# Patient Record
Sex: MALE | Race: WHITE | Employment: OTHER | URBAN - METROPOLITAN AREA
[De-identification: names, ages, dates, MRNs, and addresses within clinical notes are randomized per-mention and may not be internally consistent; named-entity substitution may affect disease eponyms.]

---

## 2019-10-19 ENCOUNTER — HOSPITAL ENCOUNTER (EMERGENCY)
Age: 84
Discharge: HOME | End: 2019-10-19
Attending: EMERGENCY MEDICINE
Payer: MEDICARE

## 2019-10-19 ENCOUNTER — APPOINTMENT (OUTPATIENT)
Dept: GENERAL RADIOLOGY | Age: 84
End: 2019-10-19
Attending: EMERGENCY MEDICINE
Payer: MEDICARE

## 2019-10-19 VITALS
WEIGHT: 145.28 LBS | DIASTOLIC BLOOD PRESSURE: 69 MMHG | HEART RATE: 89 BPM | HEIGHT: 67 IN | SYSTOLIC BLOOD PRESSURE: 158 MMHG | OXYGEN SATURATION: 93 % | RESPIRATION RATE: 16 BRPM | TEMPERATURE: 97.9 F | BODY MASS INDEX: 22.8 KG/M2

## 2019-10-19 DIAGNOSIS — S51.012A SKIN TEAR OF LEFT ELBOW WITHOUT COMPLICATION, INITIAL ENCOUNTER: ICD-10-CM
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
## 2019-10-19 PROCEDURE — 74011250636 HC RX REV CODE- 250/636: Performed by: EMERGENCY MEDICINE

## 2019-10-19 PROCEDURE — 90471 IMMUNIZATION ADMIN: CPT

## 2019-10-19 PROCEDURE — 90715 TDAP VACCINE 7 YRS/> IM: CPT | Performed by: EMERGENCY MEDICINE

## 2019-10-19 PROCEDURE — 77030018836 HC SOL IRR NACL ICUM -A

## 2019-10-19 PROCEDURE — 73080 X-RAY EXAM OF ELBOW: CPT

## 2019-10-19 PROCEDURE — 99282 EMERGENCY DEPT VISIT SF MDM: CPT

## 2019-10-19 RX ADMIN — TETANUS TOXOID, REDUCED DIPHTHERIA TOXOID AND ACELLULAR PERTUSSIS VACCINE, ADSORBED 0.5 ML: 5; 2.5; 8; 8; 2.5 SUSPENSION INTRAMUSCULAR at 15:34

## 2019-10-19 NOTE — ED TRIAGE NOTES
Pt rpts falling in the shower last night injuring his left elbow. Bandage in place. Pt also rpts he did hit his head but denies LOC.

## 2019-10-19 NOTE — ED PROVIDER NOTES
72-year-old male with no significant past medical history presents after a ground-level fall yesterday while getting into the shower. He struck his left elbow. He hit his head but did not lose consciousness. He is acting normally per his daughter. He is not vomiting. He is not confused. They brought him in mostly because of the skin tear to his left elbow. He is not sure when his last tetanus was. There are no other medical complaints at this time. He denies any other injuries. Skin Problem      Wound Check           Past Medical History:   Diagnosis Date    Cancer [facility])        Past Surgical History:   Procedure Laterality Date    HX UROLOGICAL           History reviewed. No pertinent family history.     Social History     Socioeconomic History    Marital status:      Spouse name: Not on file    Number of children: Not on file    Years of education: Not on file    Highest education level: Not on file   Occupational History    Not on file   Social Needs    Financial resource strain: Not on file    Food insecurity:     Worry: Not on file     Inability: Not on file    Transportation needs:     Medical: Not on file     Non-medical: Not on file   Tobacco Use    Smoking status: Former Smoker    Smokeless tobacco: Never Used   Substance and Sexual Activity    Alcohol use: Yes     Comment: a little    Drug use: Not on file    Sexual activity: Not on file   Lifestyle    Physical activity:     Days per week: Not on file     Minutes per session: Not on file    Stress: Not on file   Relationships    Social connections:     Talks on phone: Not on file     Gets together: Not on file     Attends Latter-day service: Not on file     Active member of club or organization: Not on file     Attends meetings of clubs or organizations: Not on file     Relationship status: Not on file    Intimate partner violence:     Fear of current or ex partner: Not on file     Emotionally abused: Not on file Physically abused: Not on file     Forced sexual activity: Not on file   Other Topics Concern    Not on file   Social History Narrative    Not on file         ALLERGIES: Sulfa (sulfonamide antibiotics)    Review of Systems   All other systems reviewed and are negative. Vitals:    10/19/19 1449   BP: 158/69   Pulse: 89   Resp: 16   Temp: 97.9 °F (36.6 °C)   SpO2: 93%   Weight: 65.9 kg (145 lb 4.5 oz)   Height: 5' 7\" (1.702 m)            Physical Exam   Constitutional: He is oriented to person, place, and time. He appears well-developed and well-nourished. HENT:   Head: Normocephalic and atraumatic. Eyes: No scleral icterus. Neck: No tracheal deviation present. Cardiovascular: Normal rate. Pulmonary/Chest: Effort normal.   Abdominal: He exhibits no distension. Musculoskeletal: He exhibits tenderness (Left elbow). He exhibits no deformity. Neurological: He is alert and oriented to person, place, and time. No cranial nerve deficit. Skin: Skin is warm and dry. Skin tear left elbow   Psychiatric: He has a normal mood and affect. Nursing note and vitals reviewed. MDM  Number of Diagnoses or Management Options  Fall, initial encounter:   Skin tear of left elbow without complication, initial encounter:   Diagnosis management comments: 80-year-old male presents after fall yesterday. He did hit his head but is acting normally and on no blood thinners. No signs of head trauma.  -Elbow x-ray normal.  -Tetanus updated  -Advised to follow-up with primary care doctor for wound recheck  -Return to the emergency department for new worsening symptoms         Procedures        Cornelius Villavicencio MD

## 2019-10-19 NOTE — ED NOTES
Belkis scrub to left elbow with NS irrigation. Steri strips to skin tear. Telfa to wound, 4 x 4 with fluffy roll gauze.

## 2019-10-19 NOTE — DISCHARGE INSTRUCTIONS
Patient Education        Preventing Falls: Care Instructions  Your Care Instructions    Getting around your home safely can be a challenge if you have injuries or health problems that make it easy for you to fall. Loose rugs and furniture in walkways are among the dangers for many older people who have problems walking or who have poor eyesight. People who have conditions such as arthritis, osteoporosis, or dementia also have to be careful not to fall. You can make your home safer with a few simple measures. Follow-up care is a key part of your treatment and safety. Be sure to make and go to all appointments, and call your doctor if you are having problems. It's also a good idea to know your test results and keep a list of the medicines you take. How can you care for yourself at home? Taking care of yourself  · You may get dizzy if you do not drink enough water. To prevent dehydration, drink plenty of fluids, enough so that your urine is light yellow or clear like water. Choose water and other caffeine-free clear liquids. If you have kidney, heart, or liver disease and have to limit fluids, talk with your doctor before you increase the amount of fluids you drink. · Exercise regularly to improve your strength, muscle tone, and balance. Walk if you can. Swimming may be a good choice if you cannot walk easily. · Have your vision and hearing checked each year or any time you notice a change. If you have trouble seeing and hearing, you might not be able to avoid objects and could lose your balance. · Know the side effects of the medicines you take. Ask your doctor or pharmacist whether the medicines you take can affect your balance. Sleeping pills or sedatives can affect your balance. · Limit the amount of alcohol you drink. Alcohol can impair your balance and other senses. · Ask your doctor whether calluses or corns on your feet need to be removed.  If you wear loose-fitting shoes because of calluses or corns, you can lose your balance and fall. · Talk to your doctor if you have numbness in your feet. Preventing falls at home  · Remove raised doorway thresholds, throw rugs, and clutter. Repair loose carpet or raised areas in the floor. · Move furniture and electrical cords to keep them out of walking paths. · Use nonskid floor wax, and wipe up spills right away, especially on ceramic tile floors. · If you use a walker or cane, put rubber tips on it. If you use crutches, clean the bottoms of them regularly with an abrasive pad, such as steel wool. · Keep your house well lit, especially [place], and outside walkways. Use night-lights in areas such as hallways and bathrooms. Add extra light switches or use remote switches (such as switches that go on or off when you clap your hands) to make it easier to turn lights on if you have to get up during the night. · Install sturdy handrails on stairways. · Move items in your cabinets so that the things you use a lot are on the lower shelves (about waist level). · Keep a cordless phone and a flashlight with new batteries by your bed. If possible, put a phone in each of the main rooms of your house, or carry a cell phone in case you fall and cannot reach a phone. Or, you can wear a device around your neck or wrist. You push a button that sends a signal for help. · Wear low-heeled shoes that fit well and give your feet good support. Use footwear with nonskid soles. Check the heels and soles of your shoes for wear. Repair or replace worn heels or soles. · Do not wear socks without shoes on wood floors. · Walk on the grass when the sidewalks are slippery. If you live in an area that gets snow and ice in the winter, sprinkle salt on slippery steps and sidewalks. Preventing falls in the bath  · Install grab bars and nonskid mats inside and outside your shower or tub and near the toilet and sinks. · Use shower chairs and bath benches.   · Use a hand-held shower head that will allow you to sit while showering. · Get into a tub or shower by putting the weaker leg in first. Get out of a tub or shower with your strong side first.  · Repair loose toilet seats and consider installing a raised toilet seat to make getting on and off the toilet easier. · Keep your bathroom door unlocked while you are in the shower. Where can you learn more? Go to http://rosanne-davonte.info/. Enter 0476 [id number] 69 [id number] in the search box to learn more about \"Preventing Falls: Care Instructions. \"  Current as of: March 16, 2018  Content Version: 11.8  © 5218-7858 [employer]. Care instructions adapted under license by [employer] (which disclaims liability or warranty for this information). If you have questions about a medical condition or this instruction, always ask your healthcare professional. [place] [address] any warranty or liability for your use of this information. Patient Education     Skin Tears: Care Instructions  Your Care Instructions  As we get older, our skin gets drier and more fragile. Sometimes this can cause the outer layers of skin to split and tear open. Skin tears are treated in different ways. In some cases, doctors use pieces of tape called Steri-Strips to pull the skin together and help it heal. Other times, it's best to leave the tear open and cover it with a special wound-care bandage. Skin tears are usually not serious. They usually heal in a few weeks. But how long you take to heal depends on your body and the type of tear you have. Sometimes the torn piece of skin is used to protect the wound while it heals. But that piece of skin does not heal. It may fall off on its own. Or the doctor may remove it. As your tear heals, it's important to keep it clean to help prevent infection. The doctor has checked you carefully, but problems can develop later.  If you notice any problems or new symptoms, get medical treatment right away.  Follow-up care is a key part of your treatment and safety. Be sure to make and go to all appointments, and call your doctor if you are having problems. It's also a good idea to know your test results and keep a list of the medicines you take. How can you care for yourself at home? · If you have pain, ask your doctor if you can take an over-the-counter pain medicine, such as acetaminophen (Tylenol), ibuprofen (Advil, Motrin), or naproxen (Aleve). Be safe with medicines. Read and follow all instructions on the label. · If you have a bandage, follow your doctor's instructions for changing it. · If you have Steri-Strips, leave them on until they fall off. · Follow your doctor's instructions about bathing. · Gently wash the skin tear with plain water 2 times a day. Do not rub the area. · Let the area air dry. Or you can pat it carefully with a soft towel. When should you call for help? Call your doctor now or seek immediate medical care if:  · You have signs of infection, such as:  ¨ Increased pain, swelling, warmth, or redness around the tear. ¨ Red streaks leading from the tear. ¨ Pus draining from the tear. ¨ A fever. · The tear starts to bleed a lot. Small amounts of blood are normal.  Watch closely for changes in your health, and be sure to contact your doctor if:  · You do not get better as expected. Where can you learn more? Go to [employer].be  Enter K393 in the search box to learn more about \"Skin Tears: Care Instructions. \"   © 3806-8147 [employer]. Care instructions adapted under license by [facility] (which disclaims liability or warranty for this information). This care instruction is for use with your licensed healthcare professional. If you have questions about a medical condition or this instruction, always ask your healthcare professional. [place] [address] any warranty or liability for your use of this information.   Content Version: 52.1.305782; Current as of: May 22, 2015

## 2024-07-12 ENCOUNTER — HOSPITAL ENCOUNTER (INPATIENT)
Facility: HOSPITAL | Age: 89
LOS: 6 days | Discharge: HOME | End: 2024-07-18
Attending: EMERGENCY MEDICINE | Admitting: FAMILY MEDICINE
Payer: MEDICARE

## 2024-07-12 ENCOUNTER — APPOINTMENT (OUTPATIENT)
Facility: HOSPITAL | Age: 89
End: 2024-07-12
Payer: MEDICARE

## 2024-07-12 DIAGNOSIS — R06.02 SHORTNESS OF BREATH: ICD-10-CM

## 2024-07-12 DIAGNOSIS — I50.9 ACUTE CONGESTIVE HEART FAILURE, UNSPECIFIED HEART FAILURE TYPE (HCC): Primary | ICD-10-CM

## 2024-07-12 DIAGNOSIS — R09.02 HYPOXEMIA: ICD-10-CM

## 2024-07-12 DIAGNOSIS — J81.0 ACUTE PULMONARY EDEMA (HCC): ICD-10-CM

## 2024-07-12 LAB
ALBUMIN SERPL-MCNC: 3.3 G/DL (ref 3.5–5)
ALBUMIN/GLOB SERPL: 1 (ref 1.1–2.2)
ALP SERPL-CCNC: 74 U/L (ref 45–117)
ALT SERPL-CCNC: 24 U/L (ref 12–78)
ANION GAP SERPL CALC-SCNC: 9 MMOL/L (ref 5–15)
AST SERPL-CCNC: 18 U/L (ref 15–37)
BASOPHILS # BLD: 0 K/UL (ref 0–0.1)
BASOPHILS NFR BLD: 0 % (ref 0–1)
BILIRUB SERPL-MCNC: 0.6 MG/DL (ref 0.2–1)
BUN SERPL-MCNC: 25 MG/DL (ref 6–20)
BUN/CREAT SERPL: 22 (ref 12–20)
CALCIUM SERPL-MCNC: 9.1 MG/DL (ref 8.5–10.1)
CHLORIDE SERPL-SCNC: 103 MMOL/L (ref 97–108)
CO2 SERPL-SCNC: 29 MMOL/L (ref 21–32)
COMMENT:: NORMAL
CREAT SERPL-MCNC: 1.15 MG/DL (ref 0.7–1.3)
DIFFERENTIAL METHOD BLD: ABNORMAL
EKG ATRIAL RATE: 81 BPM
EKG DIAGNOSIS: NORMAL
EKG P AXIS: 108 DEGREES
EKG P-R INTERVAL: 194 MS
EKG Q-T INTERVAL: 390 MS
EKG QRS DURATION: 110 MS
EKG QTC CALCULATION (BAZETT): 453 MS
EKG R AXIS: -59 DEGREES
EKG T AXIS: 86 DEGREES
EKG VENTRICULAR RATE: 81 BPM
EOSINOPHIL # BLD: 0.1 K/UL (ref 0–0.4)
EOSINOPHIL NFR BLD: 1 % (ref 0–7)
ERYTHROCYTE [DISTWIDTH] IN BLOOD BY AUTOMATED COUNT: 15.9 % (ref 11.5–14.5)
GLOBULIN SER CALC-MCNC: 3.3 G/DL (ref 2–4)
GLUCOSE SERPL-MCNC: 90 MG/DL (ref 65–100)
HCT VFR BLD AUTO: 34.4 % (ref 36.6–50.3)
HGB BLD-MCNC: 11.7 G/DL (ref 12.1–17)
IMM GRANULOCYTES # BLD AUTO: 0 K/UL (ref 0–0.04)
IMM GRANULOCYTES NFR BLD AUTO: 0 % (ref 0–0.5)
LYMPHOCYTES # BLD: 1 K/UL (ref 0.8–3.5)
LYMPHOCYTES NFR BLD: 14 % (ref 12–49)
MCH RBC QN AUTO: 31.5 PG (ref 26–34)
MCHC RBC AUTO-ENTMCNC: 34 G/DL (ref 30–36.5)
MCV RBC AUTO: 92.7 FL (ref 80–99)
MONOCYTES # BLD: 1.1 K/UL (ref 0–1)
MONOCYTES NFR BLD: 15 % (ref 5–13)
NEUTS SEG # BLD: 5.3 K/UL (ref 1.8–8)
NEUTS SEG NFR BLD: 70 % (ref 32–75)
NRBC # BLD: 0 K/UL (ref 0–0.01)
NRBC BLD-RTO: 0 PER 100 WBC
NT PRO BNP: 2432 PG/ML (ref 0–450)
PLATELET # BLD AUTO: 174 K/UL (ref 150–400)
PMV BLD AUTO: 12.2 FL (ref 8.9–12.9)
POTASSIUM SERPL-SCNC: 4.3 MMOL/L (ref 3.5–5.1)
PROT SERPL-MCNC: 6.6 G/DL (ref 6.4–8.2)
RBC # BLD AUTO: 3.71 M/UL (ref 4.1–5.7)
SODIUM SERPL-SCNC: 141 MMOL/L (ref 136–145)
SPECIMEN HOLD: NORMAL
TROPONIN I SERPL HS-MCNC: 54 NG/L (ref 0–76)
WBC # BLD AUTO: 7.5 K/UL (ref 4.1–11.1)

## 2024-07-12 PROCEDURE — 84484 ASSAY OF TROPONIN QUANT: CPT

## 2024-07-12 PROCEDURE — 93010 ELECTROCARDIOGRAM REPORT: CPT | Performed by: SPECIALIST

## 2024-07-12 PROCEDURE — 6360000002 HC RX W HCPCS: Performed by: EMERGENCY MEDICINE

## 2024-07-12 PROCEDURE — 2580000003 HC RX 258: Performed by: FAMILY MEDICINE

## 2024-07-12 PROCEDURE — 99285 EMERGENCY DEPT VISIT HI MDM: CPT

## 2024-07-12 PROCEDURE — 85025 COMPLETE CBC W/AUTO DIFF WBC: CPT

## 2024-07-12 PROCEDURE — 96374 THER/PROPH/DIAG INJ IV PUSH: CPT

## 2024-07-12 PROCEDURE — 93005 ELECTROCARDIOGRAM TRACING: CPT | Performed by: EMERGENCY MEDICINE

## 2024-07-12 PROCEDURE — 71045 X-RAY EXAM CHEST 1 VIEW: CPT

## 2024-07-12 PROCEDURE — 80053 COMPREHEN METABOLIC PANEL: CPT

## 2024-07-12 PROCEDURE — 2060000000 HC ICU INTERMEDIATE R&B

## 2024-07-12 PROCEDURE — 83880 ASSAY OF NATRIURETIC PEPTIDE: CPT

## 2024-07-12 RX ORDER — ONDANSETRON 4 MG/1
4 TABLET, ORALLY DISINTEGRATING ORAL EVERY 8 HOURS PRN
Status: DISCONTINUED | OUTPATIENT
Start: 2024-07-12 | End: 2024-07-16

## 2024-07-12 RX ORDER — ONDANSETRON 2 MG/ML
4 INJECTION INTRAMUSCULAR; INTRAVENOUS EVERY 6 HOURS PRN
Status: DISCONTINUED | OUTPATIENT
Start: 2024-07-12 | End: 2024-07-16

## 2024-07-12 RX ORDER — SODIUM CHLORIDE 0.9 % (FLUSH) 0.9 %
5-40 SYRINGE (ML) INJECTION PRN
Status: DISCONTINUED | OUTPATIENT
Start: 2024-07-12 | End: 2024-07-18 | Stop reason: HOSPADM

## 2024-07-12 RX ORDER — TAMSULOSIN HYDROCHLORIDE 0.4 MG/1
0.4 CAPSULE ORAL DAILY
Status: DISCONTINUED | OUTPATIENT
Start: 2024-07-13 | End: 2024-07-18 | Stop reason: HOSPADM

## 2024-07-12 RX ORDER — AMLODIPINE BESYLATE 5 MG/1
5 TABLET ORAL DAILY
Status: ON HOLD | COMMUNITY
End: 2024-07-17 | Stop reason: HOSPADM

## 2024-07-12 RX ORDER — TAMSULOSIN HYDROCHLORIDE 0.4 MG/1
0.4 CAPSULE ORAL DAILY
COMMUNITY

## 2024-07-12 RX ORDER — AMLODIPINE BESYLATE 5 MG/1
5 TABLET ORAL DAILY
Status: DISCONTINUED | OUTPATIENT
Start: 2024-07-13 | End: 2024-07-16

## 2024-07-12 RX ORDER — FUROSEMIDE 10 MG/ML
20 INJECTION INTRAMUSCULAR; INTRAVENOUS 2 TIMES DAILY
Status: DISCONTINUED | OUTPATIENT
Start: 2024-07-13 | End: 2024-07-14

## 2024-07-12 RX ORDER — ACETAMINOPHEN 325 MG/1
650 TABLET ORAL EVERY 6 HOURS PRN
Status: DISCONTINUED | OUTPATIENT
Start: 2024-07-12 | End: 2024-07-18 | Stop reason: HOSPADM

## 2024-07-12 RX ORDER — POTASSIUM CHLORIDE 7.45 MG/ML
10 INJECTION INTRAVENOUS PRN
Status: DISCONTINUED | OUTPATIENT
Start: 2024-07-12 | End: 2024-07-16

## 2024-07-12 RX ORDER — SODIUM CHLORIDE 9 MG/ML
INJECTION, SOLUTION INTRAVENOUS PRN
Status: DISCONTINUED | OUTPATIENT
Start: 2024-07-12 | End: 2024-07-18 | Stop reason: HOSPADM

## 2024-07-12 RX ORDER — ENOXAPARIN SODIUM 100 MG/ML
40 INJECTION SUBCUTANEOUS DAILY
Status: DISCONTINUED | OUTPATIENT
Start: 2024-07-13 | End: 2024-07-14

## 2024-07-12 RX ORDER — POTASSIUM CHLORIDE 750 MG/1
40 TABLET, FILM COATED, EXTENDED RELEASE ORAL PRN
Status: DISCONTINUED | OUTPATIENT
Start: 2024-07-12 | End: 2024-07-16

## 2024-07-12 RX ORDER — FUROSEMIDE 10 MG/ML
20 INJECTION INTRAMUSCULAR; INTRAVENOUS ONCE
Status: COMPLETED | OUTPATIENT
Start: 2024-07-12 | End: 2024-07-12

## 2024-07-12 RX ORDER — ACETAMINOPHEN 650 MG/1
650 SUPPOSITORY RECTAL EVERY 6 HOURS PRN
Status: DISCONTINUED | OUTPATIENT
Start: 2024-07-12 | End: 2024-07-18 | Stop reason: HOSPADM

## 2024-07-12 RX ORDER — POLYETHYLENE GLYCOL 3350 17 G/17G
17 POWDER, FOR SOLUTION ORAL DAILY PRN
Status: DISCONTINUED | OUTPATIENT
Start: 2024-07-12 | End: 2024-07-18 | Stop reason: HOSPADM

## 2024-07-12 RX ORDER — SODIUM CHLORIDE 0.9 % (FLUSH) 0.9 %
5-40 SYRINGE (ML) INJECTION EVERY 12 HOURS SCHEDULED
Status: DISCONTINUED | OUTPATIENT
Start: 2024-07-12 | End: 2024-07-18 | Stop reason: HOSPADM

## 2024-07-12 RX ORDER — MAGNESIUM SULFATE IN WATER 40 MG/ML
2000 INJECTION, SOLUTION INTRAVENOUS PRN
Status: DISCONTINUED | OUTPATIENT
Start: 2024-07-12 | End: 2024-07-16

## 2024-07-12 RX ADMIN — FUROSEMIDE 20 MG: 10 INJECTION, SOLUTION INTRAMUSCULAR; INTRAVENOUS at 17:34

## 2024-07-12 RX ADMIN — SODIUM CHLORIDE, PRESERVATIVE FREE 10 ML: 5 INJECTION INTRAVENOUS at 20:00

## 2024-07-12 ASSESSMENT — LIFESTYLE VARIABLES
HOW OFTEN DO YOU HAVE A DRINK CONTAINING ALCOHOL: NEVER
HOW MANY STANDARD DRINKS CONTAINING ALCOHOL DO YOU HAVE ON A TYPICAL DAY: PATIENT DOES NOT DRINK

## 2024-07-12 NOTE — ED NOTES
TRANSFER - OUT REPORT:    Verbal report given to [facility] on Tyrell Garrido  being transferred to [facility] for routine progression of patient care       Report consisted of patient's Situation, Background, Assessment and   Recommendations(SBAR).     Information from the following report(s) Nurse Handoff Report, ED Encounter Summary, and ED SBAR was reviewed with the receiving nurse.    [place] Fall Assessment:    Presents to emergency department  because of falls (Syncope, seizure, or loss of consciousness): No  Age > 70: Yes  Altered Mental Status, Intoxication with alcohol or substance confusion (Disorientation, impaired judgment, poor safety awaremess, or inability to follow instructions): No  Impaired Mobility: Ambulates or transfers with assistive devices or assistance; Unable to ambulate or transer.: Yes  Nursing Judgement: Yes          Lines:   Peripheral IV 07/12/24 Right Antecubital (Active)   Site Assessment Clean, dry & intact 07/12/24 1519   Line Status Blood return noted;Brisk blood return 07/12/24 1519   Phlebitis Assessment No symptoms 07/12/24 1519   Infiltration Assessment 0 07/12/24 1519   Alcohol Cap Used No 07/12/24 1519   Dressing Status New dressing applied 07/12/24 1519   Dressing Type Transparent 07/12/24 1519        Opportunity for questions and clarification was provided.      Patient transported with:  Monitor and O2 @ 1lpm NC

## 2024-07-12 NOTE — ED PROVIDER NOTES
[facility] EMERGENCY DEPT  EMERGENCY DEPARTMENT ENCOUNTER      Pt Name: Tyrell Garrido  MRN: [id number]  Birthdate 8/21/1920  Date of evaluation: 7/12/2024  Provider: Harrison Silva MD      HISTORY OF PRESENT ILLNESS      89-year-old male with history of hypertension presenting to the ER for shortness of breath.  According to family he has had some exertional shortness of breath for the last 4 to 6 months.  The has been reluctant to come to the hospital to get evaluated.  He states that he gets short of breath if he is getting up in the [place] just to use the bathroom or walk short distances.  No previous history of congestive heart failure.  He is 88% on room air here.  He has started on 2 L nasal cannula.  He does not use any oxygen at home.              Nursing Notes were reviewed.    REVIEW OF SYSTEMS         Review of Systems   Constitutional:  Negative for fatigue and fever.   HENT:  Negative for rhinorrhea and sore throat.    Respiratory:  Positive for shortness of breath. Negative for cough.    Cardiovascular:  Positive for leg swelling. Negative for chest pain.   Gastrointestinal:  Negative for abdominal pain and vomiting.   Musculoskeletal:  Negative for back pain and neck pain.   Skin:  Negative for rash.   Neurological:  Negative for dizziness, weakness and headaches.           PAST MEDICAL HISTORY     Past Medical History:   Diagnosis Date    Cancer (HCC)          SURGICAL HISTORY       Past Surgical History:   Procedure Laterality Date    UROLOGICAL SURGERY           CURRENT MEDICATIONS       Previous Medications    AMLODIPINE (NORVASC) 5 MG TABLET    Take 1 tablet by mouth daily    TAMSULOSIN (FLOMAX) 0.4 MG CAPSULE    Take 1 capsule by mouth daily       ALLERGIES     Patient has no known allergies.    FAMILY HISTORY     History reviewed. No pertinent family history.       SOCIAL HISTORY       Social History     Socioeconomic History    Marital status:      Spouse name: None    Number of

## 2024-07-12 NOTE — ED TRIAGE NOTES
Pt was wheeled to the treatment area accompanied by his daughter. Daughter states \"for 4-6 months he has had difficulty walking during the day without sitting down, then last night he had trouble lying flat and was more short of breath he has swollen ankles for 3 days and left lower ankle has a wound he said he drained himself.\" Pt admits to non compliance with medications Amlodipine and Flomax \"I only take my meds every 5 days.\"

## 2024-07-13 ENCOUNTER — APPOINTMENT (OUTPATIENT)
Facility: HOSPITAL | Age: 89
End: 2024-07-13
Attending: FAMILY MEDICINE
Payer: MEDICARE

## 2024-07-13 PROBLEM — R06.02 SHORTNESS OF BREATH: Status: ACTIVE | Noted: 2024-07-13

## 2024-07-13 LAB
ANION GAP SERPL CALC-SCNC: 8 MMOL/L (ref 5–15)
BUN SERPL-MCNC: 21 MG/DL (ref 6–20)
BUN/CREAT SERPL: 18 (ref 12–20)
CALCIUM SERPL-MCNC: 8.6 MG/DL (ref 8.5–10.1)
CHLORIDE SERPL-SCNC: 104 MMOL/L (ref 97–108)
CHOLEST SERPL-MCNC: 118 MG/DL
CO2 SERPL-SCNC: 27 MMOL/L (ref 21–32)
CREAT SERPL-MCNC: 1.16 MG/DL (ref 0.7–1.3)
ECHO AO ARCH DIAM: 2 CM
ECHO AO ASC DIAM: 3.9 CM
ECHO AO ASCENDING AORTA INDEX: 2.18 CM/M2
ECHO AO ROOT DIAM: 4.2 CM
ECHO AO ROOT INDEX: 2.35 CM/M2
ECHO AR MAX VEL PISA: 3.6 M/S
ECHO AV AREA PEAK VELOCITY: 1.7 CM2
ECHO AV AREA/BSA PEAK VELOCITY: 0.9 CM2/M2
ECHO AV PEAK GRADIENT: 14 MMHG
ECHO AV PEAK VELOCITY: 1.9 M/S
ECHO AV REGURGITANT PHT: 277.8 MILLISECOND
ECHO AV VELOCITY RATIO: 0.42
ECHO BSA: 1.79 M2
ECHO LA DIAMETER INDEX: 1.73 CM/M2
ECHO LA DIAMETER: 3.1 CM
ECHO LA TO AORTIC ROOT RATIO: 0.74
ECHO LA VOL A-L A2C: 44 ML (ref 18–58)
ECHO LA VOL A-L A4C: 63 ML (ref 18–58)
ECHO LA VOL BP: 50 ML (ref 18–58)
ECHO LA VOL MOD A2C: 43 ML (ref 18–58)
ECHO LA VOL MOD A4C: 57 ML (ref 18–58)
ECHO LA VOL/BSA BIPLANE: 28 ML/M2 (ref 16–34)
ECHO LA VOLUME AREA LENGTH: 53 ML
ECHO LA VOLUME INDEX A-L A2C: 25 ML/M2 (ref 16–34)
ECHO LA VOLUME INDEX A-L A4C: 35 ML/M2 (ref 16–34)
ECHO LA VOLUME INDEX AREA LENGTH: 30 ML/M2 (ref 16–34)
ECHO LA VOLUME INDEX MOD A2C: 24 ML/M2 (ref 16–34)
ECHO LA VOLUME INDEX MOD A4C: 32 ML/M2 (ref 16–34)
ECHO LV E' LATERAL VELOCITY: 7 CM/S
ECHO LV E' SEPTAL VELOCITY: 6 CM/S
ECHO LV EDV A2C: 46 ML
ECHO LV EDV A4C: 79 ML
ECHO LV EDV BP: 61 ML (ref 67–155)
ECHO LV EDV INDEX A4C: 44 ML/M2
ECHO LV EDV INDEX BP: 34 ML/M2
ECHO LV EDV NDEX A2C: 26 ML/M2
ECHO LV EJECTION FRACTION A2C: 45 %
ECHO LV EJECTION FRACTION A4C: 39 %
ECHO LV EJECTION FRACTION BIPLANE: 44 % (ref 55–100)
ECHO LV ESV A2C: 25 ML
ECHO LV ESV A4C: 48 ML
ECHO LV ESV BP: 34 ML (ref 22–58)
ECHO LV ESV INDEX A2C: 14 ML/M2
ECHO LV ESV INDEX A4C: 27 ML/M2
ECHO LV ESV INDEX BP: 19 ML/M2
ECHO LV FRACTIONAL SHORTENING: 18 % (ref 28–44)
ECHO LV INTERNAL DIMENSION DIASTOLE INDEX: 2.51 CM/M2
ECHO LV INTERNAL DIMENSION DIASTOLIC: 4.5 CM (ref 4.2–5.9)
ECHO LV INTERNAL DIMENSION SYSTOLIC INDEX: 2.07 CM/M2
ECHO LV INTERNAL DIMENSION SYSTOLIC: 3.7 CM
ECHO LV IVSD: 1.2 CM (ref 0.6–1)
ECHO LV MASS 2D: 198.1 G (ref 88–224)
ECHO LV MASS INDEX 2D: 110.7 G/M2 (ref 49–115)
ECHO LV POSTERIOR WALL DIASTOLIC: 1.2 CM (ref 0.6–1)
ECHO LV RELATIVE WALL THICKNESS RATIO: 0.53
ECHO LVOT AREA: 3.8 CM2
ECHO LVOT DIAM: 2.2 CM
ECHO LVOT MEAN GRADIENT: 1 MMHG
ECHO LVOT PEAK GRADIENT: 3 MMHG
ECHO LVOT PEAK VELOCITY: 0.8 M/S
ECHO LVOT STROKE VOLUME INDEX: 26.5 ML/M2
ECHO LVOT SV: 47.5 ML
ECHO LVOT VTI: 12.5 CM
ECHO MV A VELOCITY: 0.51 M/S
ECHO MV E DECELERATION TIME (DT): 218.1 MS
ECHO MV E VELOCITY: 0.65 M/S
ECHO MV E/A RATIO: 1.27
ECHO MV E/E' LATERAL: 9.29
ECHO MV E/E' RATIO (AVERAGED): 10.06
ECHO MV E/E' SEPTAL: 10.83
ECHO MV REGURGITANT PEAK GRADIENT: 85 MMHG
ECHO MV REGURGITANT PEAK VELOCITY: 4.6 M/S
ECHO PULMONARY ARTERY END DIASTOLIC PRESSURE: 14 MMHG
ECHO PV MAX VELOCITY: 1.1 M/S
ECHO PV PEAK GRADIENT: 5 MMHG
ECHO PV REGURGITANT MAX VELOCITY: 1.9 M/S
ECHO RV FREE WALL PEAK S': 12 CM/S
ECHO RV INTERNAL DIMENSION: 3.9 CM
ECHO RV TAPSE: 2 CM (ref 1.7–?)
ECHO TV REGURGITANT MAX VELOCITY: 2.85 M/S
ECHO TV REGURGITANT PEAK GRADIENT: 33 MMHG
GLUCOSE SERPL-MCNC: 126 MG/DL (ref 65–100)
HDLC SERPL-MCNC: 46 MG/DL
HDLC SERPL: 2.6 (ref 0–5)
LDLC SERPL CALC-MCNC: 61.6 MG/DL (ref 0–100)
MAGNESIUM SERPL-MCNC: 1.7 MG/DL (ref 1.6–2.4)
POTASSIUM SERPL-SCNC: 4 MMOL/L (ref 3.5–5.1)
SODIUM SERPL-SCNC: 139 MMOL/L (ref 136–145)
TRIGL SERPL-MCNC: 52 MG/DL
VLDLC SERPL CALC-MCNC: 10.4 MG/DL

## 2024-07-13 PROCEDURE — 6360000002 HC RX W HCPCS: Performed by: FAMILY MEDICINE

## 2024-07-13 PROCEDURE — 93005 ELECTROCARDIOGRAM TRACING: CPT

## 2024-07-13 PROCEDURE — 2060000000 HC ICU INTERMEDIATE R&B

## 2024-07-13 PROCEDURE — 93306 TTE W/DOPPLER COMPLETE: CPT | Performed by: INTERNAL MEDICINE

## 2024-07-13 PROCEDURE — 83735 ASSAY OF MAGNESIUM: CPT

## 2024-07-13 PROCEDURE — 94761 N-INVAS EAR/PLS OXIMETRY MLT: CPT

## 2024-07-13 PROCEDURE — 36415 COLL VENOUS BLD VENIPUNCTURE: CPT

## 2024-07-13 PROCEDURE — 6370000000 HC RX 637 (ALT 250 FOR IP): Performed by: FAMILY MEDICINE

## 2024-07-13 PROCEDURE — 2580000003 HC RX 258: Performed by: FAMILY MEDICINE

## 2024-07-13 PROCEDURE — 93306 TTE W/DOPPLER COMPLETE: CPT

## 2024-07-13 PROCEDURE — 80061 LIPID PANEL: CPT

## 2024-07-13 PROCEDURE — 80048 BASIC METABOLIC PNL TOTAL CA: CPT

## 2024-07-13 RX ADMIN — FUROSEMIDE 20 MG: 10 INJECTION, SOLUTION INTRAMUSCULAR; INTRAVENOUS at 17:45

## 2024-07-13 RX ADMIN — SODIUM CHLORIDE, PRESERVATIVE FREE 10 ML: 5 INJECTION INTRAVENOUS at 20:54

## 2024-07-13 RX ADMIN — FUROSEMIDE 20 MG: 10 INJECTION, SOLUTION INTRAMUSCULAR; INTRAVENOUS at 08:51

## 2024-07-13 RX ADMIN — ENOXAPARIN SODIUM 40 MG: 100 INJECTION SUBCUTANEOUS at 08:51

## 2024-07-13 RX ADMIN — AMLODIPINE BESYLATE 5 MG: 5 TABLET ORAL at 08:50

## 2024-07-13 RX ADMIN — SODIUM CHLORIDE, PRESERVATIVE FREE 10 ML: 5 INJECTION INTRAVENOUS at 08:54

## 2024-07-13 RX ADMIN — POLYETHYLENE GLYCOL 3350 17 G: 17 POWDER, FOR SOLUTION ORAL at 20:54

## 2024-07-13 RX ADMIN — TAMSULOSIN HYDROCHLORIDE 0.4 MG: 0.4 CAPSULE ORAL at 08:50

## 2024-07-13 NOTE — FLOWSHEET NOTE
Patient w/ burst of NSVT - 11 beats. Asymptomatc. EKG w/ SR w/ PACs, LAFB, ? LVH. Will check AM labs now. Continue to monitor.

## 2024-07-13 NOTE — PROGRESS NOTES
0700 Bedside and Verbal shift change report given to SUNITA Hussein and SUNITA Landry (oncoming nurse) by SUNITA Deleon (offgoing nurse). Report included the following information Nurse Handoff Report, Index, Recent Results, Cardiac Rhythm NSR, Quality Measures, and Neuro Assessment.     1900 Bedside and Verbal shift change report given to SUNITA Hernandez (oncoming nurse) by SUNITA Landry (offgoing nurse). Report included the following information Nurse Handoff Report, MAR, Recent Results, Cardiac Rhythm NSR, Quality Measures, and Neuro Assessment.

## 2024-07-13 NOTE — H&P
Hospitalist Admission Note    NAME:  Tyrell Garrido   :  1920   MRN:  [id number]     Date/Time:  2024 9:00 PM    Patient PCP: Catracho Brantley MD  ________________________________________________________________________    Given the patient's current clinical presentation, I have a high level of concern for decompensation if discharged from the emergency department.  Complex decision making was performed, which includes reviewing the patient's available past medical records, laboratory results, and x-ray films.       My assessment of this patient's clinical condition and my plan of care is as follows.    Assessment / Plan:    Tyrell is a 89 y.o.   male with PMHx hypertension and BPH who presents with shortness of breath and leg swelling suspicious for new onset CHF.    #shortness of breath and leg swelling:  Acute, present on admission.  BNP elevated and CXR shows pulmonary edema.  Likely new onset CHF.  -lasix 20 mg IV BID.  May titrate based on response.    -echo  -cards consult  -low sodium diet  -fluid restriction  -Guideline directed therapy based on echo.  -Telemetry  - Wean O2  -Strict I's and O's  - Daily weights    Hypoxemia: Acute, present on admission.  Likely secondary to fluid overload.  Will diurese with Lasix as above.  Wean O2 as tolerated.    BPH: Chronic, controlled.  - Continue tamsulosin    Hypertension: Chronic, controlled.  Continue amlodipine          I have personally reviewed the radiographs, laboratory data in Epic and decisions and statements above are based partially on this personal interpretation.    Code Status: DNR/DNI  DVT Prophylaxis: Lovenox  GI Prophylaxis: not indicated       Subjective:   CHIEF COMPLAINT: \"shortness of breath\"    HISTORY OF PRESENT ILLNESS:     Tyrell is a 89 y.o.   male with PMHx hypertension and BPH who presents with shortness of breath and leg swelling.  Patient reports that over the last 4 to 6 months he has had increasing

## 2024-07-13 NOTE — ACP (ADVANCE CARE PLANNING)
Advance Care Planning (ACP) Provider Note - Comprehensive     Date of ACP Conversation: 07/13/24  Persons included in Conversation:  patient and family  Length of ACP Conversation in minutes:  16 minutes      Diagnosis  CHF   Authorized Decision Maker (if patient is incapable of making informed decisions):   This person is:  Next of Kin by law (only applies in absence of above)          General ACP for ALL Patients with Decision Making Capacity:   Importance of advance care planning, including choosing a healthcare agent to communicate patient's healthcare decisions if patient lost the ability to make decisions, such as after a sudden illness or accident  Understanding of the healthcare agent role was assessed and information provided  Exploration of values, goals, and preferences if recovery is not expected, even with continued medical treatment in the event of: Imminent death  Severe, permanent brain injury  \"In these circumstances, what matters most to you?\"  Care focused more on comfort or quality of life.  Opportunity offered to explore how cultural, Temple, spiritual, or personal beliefs would affect decisions for future care       For Serious or Chronic Illness:  Understanding of medical condition    Understanding of CPR, goals and expected outcomes, benefits and burdens discussed.  Information on CPR success rates provided (e.g. for CPR in hospital, survival to d/c at two weeks is 22%, for chronically ill or elderly/frail survival is less than 3%); Individual asked to communicate understanding of information in his/her own words.  Explored fears and concerns regarding CPR or possible outcomes    Interventions Provided:  Entered DNR order (If yes, complete Durable DNR form)

## 2024-07-13 NOTE — CONSULTS
Pt personally seen and examined. Chart reviewed.    Agree with advanced NP's history, exam and  A/P with changes/additons.    Patient is very hard of hearing.  States that his breathing is improved.    Blood pressure 112/65, pulse 75, temperature 98.6 °F (37 °C), temperature source Oral, resp. rate 14, height 1.727 m (5' 8\"), weight 66.7 kg (147 lb), SpO2 97 %.    Elderly-on oxygen-NC, not in acute distress  CVS-S1-S2 present, 2/6 systolic murmur present  RS-   scattered bilateral rhonchi present  Abdomen-  /soft/NT  LE-   trace edema    A/P :    Dyspnea-acute CHF-on IV Lasix.  Monitor creatinine/I's and O's.  Echocardiogram to evaluate EF.    History of BPH    Hypertension-blood pressure controlled    Advanced age-discussed with family-no invasive procedures.  Medical management        Femi Macario. MD, formerly [facility]    [facility] CARDIOLOGY                    Cardiology Care Note     [x]Initial Encounter     []Follow-up    Patient Name: Tyrell Garrido - :1920 - MRN:[id number]  Primary Cardiologist: None  Consulting Cardiologist: Femi Macario MD     Reason for encounter: SOB    HPI:       Tyrell Garrido is a 89 y.o. male with PMH significant for hypertension and BPH. Admitted with SOB and MARIA A suspicious for new onset CHF.    Subjective:    Accompanied by family  Tyrell Garrido reports none. Pt [place]. Family states SOB is better. Edema decreased     Assessment and Plan     SOB  -pBNP 2432  -CXR shows pulmonary edema  -Cont lasix 20 mg IV BID   -Echo  -low sodium diet, fluid restriction  -Medically manage based on echo    Hypoxemia: Acute, present on admission.  Likely secondary to fluid overload.  Will diurese with Lasix as above.  Wean O2 as tolerated.     BPH: Chronic, controlled.  - Continue tamsulosin     Hypertension: Chronic, controlled.  Continue PTA amlodipine         ____________________________________________________________    Cardiac  07/12/24  1517 07/13/24  0228    139   K 4.3 4.0    104   CO2 29 27   BUN 25* 21*     Recent Labs     07/12/24  1517   WBC 7.5   HGB 11.7*   HCT 34.4*        No results for input(s): \"INR\" in the last 72 hours.    Invalid input(s): \"PTTP\", \"PTP\", \"GPT\", \"SGOT\", \"AP\"  Recent Labs     07/13/24  0228   CHOL 118         Current meds:    Current Facility-Administered Medications:     amLODIPine (NORVASC) tablet 5 mg, 5 mg, Oral, Daily, Nikolay Kim MD, 5 mg at 07/13/24 0850    tamsulosin (FLOMAX) capsule 0.4 mg, 0.4 mg, Oral, Daily, Nikolay Kim MD, 0.4 mg at 07/13/24 0850    sodium chloride flush 0.9 % injection 5-40 mL, 5-40 mL, IntraVENous, 2 times per day, Nikolay Kim MD, 10 mL at 07/13/24 0854    sodium chloride flush 0.9 % injection 5-40 mL, 5-40 mL, IntraVENous, PRN, Nikolay Kim MD    0.9 % sodium chloride infusion, , IntraVENous, PRN, Nikolay Kim MD    ondansetron (ZOFRAN-ODT) disintegrating tablet 4 mg, 4 mg, Oral, Q8H PRN **OR** ondansetron (ZOFRAN) injection 4 mg, 4 mg, IntraVENous, Q6H PRN, Nikolay Kim MD    polyethylene glycol (GLYCOLAX) packet 17 g, 17 g, Oral, Daily PRN, Nikolay Kim MD    acetaminophen (TYLENOL) tablet 650 mg, 650 mg, Oral, Q6H PRN **OR** acetaminophen (TYLENOL) suppository 650 mg, 650 mg, Rectal, Q6H PRN, Nikolay Kim MD    enoxaparin (LOVENOX) injection 40 mg, 40 mg, SubCUTAneous, Daily, Nikolay Kim MD, 40 mg at 07/13/24 0851    potassium chloride (KLOR-CON) extended release tablet 40 mEq, 40 mEq, Oral, PRN **OR** potassium bicarb-citric acid (EFFER-K) effervescent tablet 40 mEq, 40 mEq, Oral, PRN **OR** potassium chloride 10 mEq/100 mL IVPB (Peripheral Line), 10 mEq, IntraVENous, PRN, Nikolay Kim MD    magnesium sulfate 2000 mg in 50 mL IVPB premix, 2,000 mg, IntraVENous, PRN, Nikolay Kim MD    furosemide (LASIX) injection 20 mg, 20 mg, IntraVENous, BID, Nikolay Kim MD, 20 mg at 07/13/24 0851    Goldie CAZARES

## 2024-07-13 NOTE — PROGRESS NOTES
pain  Hematology:  easy bruising, nose or gum bleeding, lymphadenopathy   Dermatological: rash, ulceration, pruritis, color change / jaundice  Endocrine:   hot flashes or polydipsia   Neurological:  headache, dizziness, confusion, focal weakness, paresthesia,     Speech difficulties, memory loss, gait difficulty  Psychological: Feelings of anxiety, depression, agitation      Vital Signs:    Last 24hrs VS reviewed since prior progress note. Most recent are:  Vitals:    07/13/24 0722   BP: 134/89   Pulse: 83   Resp: 20   Temp: 97.7 °F (36.5 °C)   SpO2: 98%         Intake/Output Summary (Last 24 hours) at 7/13/2024 0729  Last data filed at 7/12/2024 2000  Gross per 24 hour   Intake 10 ml   Output 440 ml   Net -430 ml        Physical Examination:     I had a face to face encounter with this patient and independently examined them on 7/13/2024 as outlined below:          General : alert x 3, awake, no acute distress,   HEENT: PEERL, EOMI, moist mucus membrane  Neck: supple, no JVD  Chest: decreased at bases   CVS: S1 S2 heard, Capillary refill less than 2 seconds  Abd: soft/ non tender, non distended, BS physiological,   Ext: no clubbing, no cyanosis, 1+ edema, brisk 2+ DP pulses  Neuro/Psych: pleasant mood and affect, moving all extremities spontaneously, no focal neurological deficit  Skin: warm     Data Review:    Review and/or order of clinical lab test  Review and/or order of tests in the radiology section of CPT  Review and/or order of tests in the medicine section of CPT  Discussion of test results with performing physician  I personally reviewed  Image and EKG/Monitor Tracing      I have personally and independently reviewed all pertinent labs, diagnostic studies, imaging, and have provided independent interpretation of the same.     Labs:     Recent Labs     07/12/24  1517   WBC 7.5   HGB 11.7*   HCT 34.4*        Recent Labs     07/12/24  1517 07/13/24  0228    139   K 4.3 4.0    104   CO2 29  injection 40 mg  40 mg SubCUTAneous Daily    potassium chloride (KLOR-CON) extended release tablet 40 mEq  40 mEq Oral PRN    Or    potassium bicarb-citric acid (EFFER-K) effervescent tablet 40 mEq  40 mEq Oral PRN    Or    potassium chloride 10 mEq/100 mL IVPB (Peripheral Line)  10 mEq IntraVENous PRN    magnesium sulfate 2000 mg in 50 mL IVPB premix  2,000 mg IntraVENous PRN    furosemide (LASIX) injection 20 mg  20 mg IntraVENous BID     ______________________________________________________________________  EXPECTED LENGTH OF STAY: 5  ACTUAL LENGTH OF STAY:          1                 GABRIELE SULLIVAN MD

## 2024-07-14 LAB
ANION GAP SERPL CALC-SCNC: 6 MMOL/L (ref 5–15)
BASOPHILS # BLD: 0 K/UL (ref 0–0.1)
BASOPHILS NFR BLD: 0 % (ref 0–1)
BUN SERPL-MCNC: 27 MG/DL (ref 6–20)
BUN/CREAT SERPL: 22 (ref 12–20)
CALCIUM SERPL-MCNC: 8.3 MG/DL (ref 8.5–10.1)
CHLORIDE SERPL-SCNC: 104 MMOL/L (ref 97–108)
CO2 SERPL-SCNC: 29 MMOL/L (ref 21–32)
CREAT SERPL-MCNC: 1.23 MG/DL (ref 0.7–1.3)
DIFFERENTIAL METHOD BLD: ABNORMAL
EOSINOPHIL # BLD: 0.1 K/UL (ref 0–0.4)
EOSINOPHIL NFR BLD: 1 % (ref 0–7)
ERYTHROCYTE [DISTWIDTH] IN BLOOD BY AUTOMATED COUNT: 15.5 % (ref 11.5–14.5)
GLUCOSE SERPL-MCNC: 105 MG/DL (ref 65–100)
HCT VFR BLD AUTO: 35 % (ref 36.6–50.3)
HGB BLD-MCNC: 11.5 G/DL (ref 12.1–17)
IMM GRANULOCYTES # BLD AUTO: 0 K/UL (ref 0–0.04)
IMM GRANULOCYTES NFR BLD AUTO: 1 % (ref 0–0.5)
LYMPHOCYTES # BLD: 1.2 K/UL (ref 0.8–3.5)
LYMPHOCYTES NFR BLD: 16 % (ref 12–49)
MAGNESIUM SERPL-MCNC: 1.7 MG/DL (ref 1.6–2.4)
MCH RBC QN AUTO: 31.3 PG (ref 26–34)
MCHC RBC AUTO-ENTMCNC: 32.9 G/DL (ref 30–36.5)
MCV RBC AUTO: 95.1 FL (ref 80–99)
MONOCYTES # BLD: 1.1 K/UL (ref 0–1)
MONOCYTES NFR BLD: 14 % (ref 5–13)
NEUTS SEG # BLD: 5.2 K/UL (ref 1.8–8)
NEUTS SEG NFR BLD: 69 % (ref 32–75)
NRBC # BLD: 0 K/UL (ref 0–0.01)
NRBC BLD-RTO: 0 PER 100 WBC
PHOSPHATE SERPL-MCNC: 3.9 MG/DL (ref 2.6–4.7)
PLATELET # BLD AUTO: 160 K/UL (ref 150–400)
PMV BLD AUTO: 12.3 FL (ref 8.9–12.9)
POTASSIUM SERPL-SCNC: 3.6 MMOL/L (ref 3.5–5.1)
RBC # BLD AUTO: 3.68 M/UL (ref 4.1–5.7)
SODIUM SERPL-SCNC: 139 MMOL/L (ref 136–145)
WBC # BLD AUTO: 7.6 K/UL (ref 4.1–11.1)

## 2024-07-14 PROCEDURE — 6370000000 HC RX 637 (ALT 250 FOR IP): Performed by: FAMILY MEDICINE

## 2024-07-14 PROCEDURE — 93005 ELECTROCARDIOGRAM TRACING: CPT | Performed by: STUDENT IN AN ORGANIZED HEALTH CARE EDUCATION/TRAINING PROGRAM

## 2024-07-14 PROCEDURE — 83735 ASSAY OF MAGNESIUM: CPT

## 2024-07-14 PROCEDURE — 85025 COMPLETE CBC W/AUTO DIFF WBC: CPT

## 2024-07-14 PROCEDURE — 97116 GAIT TRAINING THERAPY: CPT

## 2024-07-14 PROCEDURE — 93005 ELECTROCARDIOGRAM TRACING: CPT

## 2024-07-14 PROCEDURE — 80048 BASIC METABOLIC PNL TOTAL CA: CPT

## 2024-07-14 PROCEDURE — 97161 PT EVAL LOW COMPLEX 20 MIN: CPT

## 2024-07-14 PROCEDURE — 2700000000 HC OXYGEN THERAPY PER DAY

## 2024-07-14 PROCEDURE — 36415 COLL VENOUS BLD VENIPUNCTURE: CPT

## 2024-07-14 PROCEDURE — 94761 N-INVAS EAR/PLS OXIMETRY MLT: CPT

## 2024-07-14 PROCEDURE — 1100000000 HC RM PRIVATE

## 2024-07-14 PROCEDURE — 6360000002 HC RX W HCPCS: Performed by: FAMILY MEDICINE

## 2024-07-14 PROCEDURE — 2580000003 HC RX 258: Performed by: FAMILY MEDICINE

## 2024-07-14 PROCEDURE — 84100 ASSAY OF PHOSPHORUS: CPT

## 2024-07-14 RX ORDER — ENOXAPARIN SODIUM 100 MG/ML
30 INJECTION SUBCUTANEOUS DAILY
Status: DISCONTINUED | OUTPATIENT
Start: 2024-07-15 | End: 2024-07-16

## 2024-07-14 RX ORDER — FUROSEMIDE 20 MG/1
20 TABLET ORAL DAILY
Status: DISCONTINUED | OUTPATIENT
Start: 2024-07-15 | End: 2024-07-18 | Stop reason: HOSPADM

## 2024-07-14 RX ADMIN — SODIUM CHLORIDE, PRESERVATIVE FREE 10 ML: 5 INJECTION INTRAVENOUS at 20:47

## 2024-07-14 RX ADMIN — POLYETHYLENE GLYCOL 3350 17 G: 17 POWDER, FOR SOLUTION ORAL at 17:58

## 2024-07-14 RX ADMIN — SODIUM CHLORIDE, PRESERVATIVE FREE 10 ML: 5 INJECTION INTRAVENOUS at 08:24

## 2024-07-14 RX ADMIN — ENOXAPARIN SODIUM 40 MG: 100 INJECTION SUBCUTANEOUS at 08:24

## 2024-07-14 RX ADMIN — TAMSULOSIN HYDROCHLORIDE 0.4 MG: 0.4 CAPSULE ORAL at 08:24

## 2024-07-14 RX ADMIN — FUROSEMIDE 20 MG: 10 INJECTION, SOLUTION INTRAMUSCULAR; INTRAVENOUS at 08:24

## 2024-07-14 NOTE — PLAN OF CARE
Problem: Physical Therapy - Adult  Goal: By Discharge: Performs mobility at highest level of function for planned discharge setting.  See evaluation for individualized goals.  Description: FUNCTIONAL STATUS PRIOR TO ADMISSION: Patient was modified independent using a rolling walker for functional mobility.    HOME SUPPORT PRIOR TO ADMISSION: The patient lived with daughter and son in law with 24/7 assistance available.    Physical Therapy Goals  Initiated 7/14/2024  1.  Patient will perform sit to stand with modified independence with NC line mgmt within 7 day(s).  2.  Patient will transfer from bed to chair and chair to bed with modified independence using the least restrictive device within 7 day(s).  3.  Patient will ambulate with modified independence for 250 feet with the least restrictive device within 7 day(s).       Outcome: Progressing   PHYSICAL THERAPY EVALUATION    Patient: Tyrell Garrido (89 y.o. male)  Date: 7/14/2024  Primary Diagnosis: Heart failure, unspecified (HCC) [I50.9]  Shortness of breath [R06.02]  Hypoxemia [R09.02]  Acute pulmonary edema (HCC) [J81.0]  Acute congestive heart failure, unspecified heart failure type (HCC) [I50.9]       Precautions: Restrictions/Precautions: Fall Risk, General Precautions                      ASSESSMENT :   DEFICITS/IMPAIRMENTS:   The patient is limited by decreased functional mobility, activity tolerance. Pt presented with inc in dyspnea with mobility; found to have acute CHF and pulmonary edema. Pt received on 2L NC; completed walking pulse oximetry assessment:  Documentation for home O2:     ROOM AIR    AT REST   O2 SATS  91 HR  94   ROOM AIR WITH ACTIVITY 02 SATS  85 HR  130   (2    ) LITERS OF O2 WITH ACTIVITY O2 SATS  92 HR  124   (2    )LITERS OF 02 PATIENT LEFT COMFORTABLY  SITTING/SUPINE 02 SATS  95 HR  98     Pt had good sequencing with all functional mobility with RW and only required supervision with ambulation with RW due to supplemental oxygen

## 2024-07-14 NOTE — PROGRESS NOTES
Pharmacy Dosing Note    Ordered medication: Enoxaparin 40 mg every 24 hours    New medication: Change to enoxaparin 30 mg every 24 hours due to CrCl between 15 and 30 mL/min per [facility] protocol.    Estimated Creatinine Clearance: 25 mL/min (based on SCr of 1.23 mg/dL).    Thank you,  Amol Tenorio formerly [facility]

## 2024-07-14 NOTE — PROGRESS NOTES
Pt personally seen and examined. Chart reviewed.     Agree with advanced NP's history, exam and  A/P with changes/additons.     Patient is very hard of hearing.       Blood pressure 115/62, pulse 80, temperature 97.3 °F (36.3 °C), temperature source Oral, resp. rate 13, height 1.727 m (5' 8\"), weight 60.1 kg (132 lb 6.4 oz), SpO2 97 %.       Elderly-on oxygen-NC, not in acute distress  CVS-S1-S2 present, 2/6 systolic murmur present  RS-   scattered bilateral rhonchi present  Abdomen-  /soft/NT  LE-   trace edema     24    ECHO (TTE) COMPLETE (PRN CONTRAST/BUBBLE/STRAIN/3D) 2024  6:44 PM (Final)    Interpretation Summary    Left Ventricle: Low normal left ventricular systolic function with a visually estimated EF of 50 - 55%. Left ventricle size is normal. Mildly increased wall thickness. Normal wall motion.    Aortic Valve: Mild regurgitation.    Image quality is good. Procedure performed with the patient in a sitting position.    Signed by: Femi Macario MD on 2024  6:44 PM      A/P :     Dyspnea-acute CHF-Transition to PO lasix.  Monitor creatinine/I's and O's.     History of BPH     Hypertension-blood pressure controlled     Advanced age-discussed with family-no invasive procedures.  Medical management      Will see prn     Femi Macario. MD, [facility] CARDIOLOGY                    Cardiology Care Note     []Initial Encounter     [x]Follow-up    Patient Name: Tyrell Garrido - :1920 - MRN:[id number]  Primary Cardiologist: None  Consulting Cardiologist: Femi Macario MD     Reason for encounter: SOB    HPI:       Tyrell Garrido is a 89 y.o. male with PMH significant for hypertension and BPH. Admitted with SOB and MARIA A suspicious for new onset CHF.    Subjective:    Accompanied by family  Tyrell Garrido reports none. Pt [place]. Remains on 2L. Denies SOB     Assessment and Plan     SOB  -pBNP 2432  -Change to lasix 20 mg  PO daily  -Echo EF 50-55%  -low sodium diet, fluid restriction  -Medically manage     Hypoxemia: Acute, present on admission.  Likely secondary to fluid overload.  Will diurese with Lasix as above.  Wean O2 as tolerated.     BPH: Chronic, controlled.  - Continue tamsulosin     Hypertension: Chronic, controlled.  Continue PTA amlodipine    Advanced age-discussed with family-no invasive procedures.  Medical management    Ok for discharge from cardiology standpoint  Can follow with PCP     ____________________________________________________________    Cardiac testing  07/12/24    ECHO (TTE) COMPLETE (PRN CONTRAST/BUBBLE/STRAIN/3D) 07/13/2024  6:44 PM (Final)    Interpretation Summary    Left Ventricle: Low normal left ventricular systolic function with a visually estimated EF of 50 - 55%. Left ventricle size is normal. Mildly increased wall thickness. Normal wall motion.    Aortic Valve: Mild regurgitation.    Image quality is good. Procedure performed with the patient in a sitting position.    Signed by: Femi Macario MD on 7/13/2024  6:44 PM      No results found for this or any previous visit.    No results found for this or any previous visit.      Most recent HS troponins:  Recent Labs     07/12/24  1517   TROPHS 54       ECG: Sinus Rhythm , PAC's noted, Left anterior fascicular block    Review of Systems:    [x]All other systems reviewed and all negative except as written in HPI    [] Patient unable to provide secondary to condition    Past Medical History:   Diagnosis Date    Cancer (HCC)      Past Surgical History:   Procedure Laterality Date    UROLOGICAL SURGERY       Social Hx:  reports that he has quit smoking. He has never used smokeless tobacco. He reports current alcohol use. He reports that he does not use drugs.  Family Hx: family history is not on file.  Allergies   Allergen Reactions    Cipro [Ciprofloxacin Hcl] Other (See Comments)     Unknown            OBJECTIVE:  Wt Readings from Last 3

## 2024-07-14 NOTE — FLOWSHEET NOTE
Patient w/ burst of NSVT - 5 beats. Asymptomatc. EKG w/ SR w/ SA, LAFB, ? LVH. Will check AM labs now. Cards following. Continue to monitor.

## 2024-07-14 NOTE — PROGRESS NOTES
OT order received, chart reviewed. Attempted to see patient however he was sleeping very soundly after working with physical therapy. Deferred attempts at awakening patient to allow for his rest. OT evaluation remains pending.   Patricia Perdomo, OTR/L

## 2024-07-14 NOTE — PROGRESS NOTES
[facility]ist [facility]                                                                               Hospitalist Progress Note  GABRIELE SULLIVAN MD          Date of Service:  2024  NAME:  Tyrell Garrido  :  1920  MRN:  [id number]    Please note that this dictation was completed with [employer], the computer voice recognition software.  Quite often unanticipated grammatical, syntax, homophones, and other interpretive errors are inadvertently transcribed by the computer software.  Please disregard these errors.  Please excuse any errors that have escaped final proofreading.    Admission Summary:    89 y.o.   male with PMHx hypertension and BPH who presents with shortness of breath and leg swelling suspicious for new onset CHF.           Interval history / Subjective:   Reports feeling well and ready for dc      Assessment & Plan:       Anticipated discharge date : 7/15  Anticipated disposition : Home with    Barriers to discharge : medical stability     Acute cardiogenic pulmonary edema likely acute on chronic CHF   Causing Dyspnea and bilateral pedal edema Acute, present on admission.  BNP elevated and CXR shows pulmonary edema.  Likely new onset CHF.ECHO showed preserved EF      -lasix 20 mg IV BID.    -cards following  -low sodium diet  -fluid restriction  -Telemetry  - Wean O2  -Strict I's and O's  - Daily weights     Left ankle wound   -wound care consulted      Hypoxemia: Acute, present on admission.  Likely secondary to fluid overload.  Will diurese with Lasix as above.  Wean O2 as tolerated.     BPH: Chronic, controlled.  - Continue tamsulosin     Hypertension: Chronic, controlled.  Continue amlodipine        Code status: Full   Prophylaxis: enoxaparin sc   Care Plan discussed with: patient,family,  RN  Anticipated Disposition:   Inpatient  Cardiac monitoring: =Telemetry                 Social Determinants of Health     Tobacco Use: Medium Risk (2024)  (GLYCOLAX) packet 17 g  17 g Oral Daily PRN    acetaminophen (TYLENOL) tablet 650 mg  650 mg Oral Q6H PRN    Or    acetaminophen (TYLENOL) suppository 650 mg  650 mg Rectal Q6H PRN    enoxaparin (LOVENOX) injection 40 mg  40 mg SubCUTAneous Daily    potassium chloride (KLOR-CON) extended release tablet 40 mEq  40 mEq Oral PRN    Or    potassium bicarb-citric acid (EFFER-K) effervescent tablet 40 mEq  40 mEq Oral PRN    Or    potassium chloride 10 mEq/100 mL IVPB (Peripheral Line)  10 mEq IntraVENous PRN    magnesium sulfate 2000 mg in 50 mL IVPB premix  2,000 mg IntraVENous PRN    furosemide (LASIX) injection 20 mg  20 mg IntraVENous BID     ______________________________________________________________________  EXPECTED LENGTH OF STAY: 5  ACTUAL LENGTH OF STAY:          2                 GABRIELE SULLIVAN MD

## 2024-07-15 ENCOUNTER — APPOINTMENT (OUTPATIENT)
Facility: HOSPITAL | Age: 89
End: 2024-07-15
Payer: MEDICARE

## 2024-07-15 LAB — NT PRO BNP: 1619 PG/ML

## 2024-07-15 PROCEDURE — 97530 THERAPEUTIC ACTIVITIES: CPT | Performed by: OCCUPATIONAL THERAPIST

## 2024-07-15 PROCEDURE — 73600 X-RAY EXAM OF ANKLE: CPT

## 2024-07-15 PROCEDURE — 6370000000 HC RX 637 (ALT 250 FOR IP)

## 2024-07-15 PROCEDURE — 97535 SELF CARE MNGMENT TRAINING: CPT | Performed by: OCCUPATIONAL THERAPIST

## 2024-07-15 PROCEDURE — 2700000000 HC OXYGEN THERAPY PER DAY

## 2024-07-15 PROCEDURE — 83880 ASSAY OF NATRIURETIC PEPTIDE: CPT

## 2024-07-15 PROCEDURE — 94761 N-INVAS EAR/PLS OXIMETRY MLT: CPT

## 2024-07-15 PROCEDURE — 97165 OT EVAL LOW COMPLEX 30 MIN: CPT | Performed by: OCCUPATIONAL THERAPIST

## 2024-07-15 PROCEDURE — 36415 COLL VENOUS BLD VENIPUNCTURE: CPT

## 2024-07-15 PROCEDURE — 6360000002 HC RX W HCPCS: Performed by: STUDENT IN AN ORGANIZED HEALTH CARE EDUCATION/TRAINING PROGRAM

## 2024-07-15 PROCEDURE — 6370000000 HC RX 637 (ALT 250 FOR IP): Performed by: FAMILY MEDICINE

## 2024-07-15 PROCEDURE — 1100000000 HC RM PRIVATE

## 2024-07-15 PROCEDURE — 2580000003 HC RX 258: Performed by: FAMILY MEDICINE

## 2024-07-15 RX ORDER — SENNA AND DOCUSATE SODIUM 50; 8.6 MG/1; MG/1
2 TABLET, FILM COATED ORAL DAILY
Status: DISCONTINUED | OUTPATIENT
Start: 2024-07-15 | End: 2024-07-18 | Stop reason: HOSPADM

## 2024-07-15 RX ORDER — POLYETHYLENE GLYCOL 3350 17 G/17G
17 POWDER, FOR SOLUTION ORAL DAILY
Status: DISCONTINUED | OUTPATIENT
Start: 2024-07-15 | End: 2024-07-18 | Stop reason: HOSPADM

## 2024-07-15 RX ADMIN — AMLODIPINE BESYLATE 5 MG: 5 TABLET ORAL at 08:12

## 2024-07-15 RX ADMIN — SODIUM CHLORIDE, PRESERVATIVE FREE 10 ML: 5 INJECTION INTRAVENOUS at 08:12

## 2024-07-15 RX ADMIN — TAMSULOSIN HYDROCHLORIDE 0.4 MG: 0.4 CAPSULE ORAL at 08:12

## 2024-07-15 RX ADMIN — ACETAMINOPHEN 650 MG: 325 TABLET ORAL at 10:29

## 2024-07-15 RX ADMIN — SODIUM CHLORIDE, PRESERVATIVE FREE 10 ML: 5 INJECTION INTRAVENOUS at 20:40

## 2024-07-15 RX ADMIN — FUROSEMIDE 20 MG: 20 TABLET ORAL at 08:12

## 2024-07-15 RX ADMIN — ENOXAPARIN SODIUM 30 MG: 100 INJECTION SUBCUTANEOUS at 08:12

## 2024-07-15 ASSESSMENT — PAIN DESCRIPTION - LOCATION: LOCATION: LEG

## 2024-07-15 ASSESSMENT — PAIN DESCRIPTION - ORIENTATION: ORIENTATION: LEFT;RIGHT

## 2024-07-15 ASSESSMENT — PAIN SCALES - GENERAL
PAINLEVEL_OUTOF10: 0
PAINLEVEL_OUTOF10: 1

## 2024-07-15 ASSESSMENT — PAIN DESCRIPTION - DESCRIPTORS: DESCRIPTORS: SORE

## 2024-07-15 NOTE — WOUND CARE
Wound Care Note:     New consult for \"left ankle wound\"   Seen in 502/01    89 y.o. y/o male admitted on 7/12/2024   Admitted for Heart failure, unspecified (HCC) [I50.9]  Shortness of breath [R06.02]  Hypoxemia [R09.02]  Acute pulmonary edema (HCC) [J81.0]  Acute congestive heart failure, unspecified heart failure type (HCC) [I50.9]     History of HTN, BPH   WBC = 7.6  No indication for wound culture  Diet: ADULT DIET; Regular; No Added Salt (3-4 gm); 1500 ml           Assessment:   Patient is alert, cooperative and reports no pain.   Mobile, requires 1 assist in repositioning.  Patient in recliner at time of assessment.   Incontinent.Wearing briefs.    Surface: Ricky bed with CLAUDIA mattress    Buttocks and sacrum intact with blanchable erythema; sacral foam removed due to moisture.    1. POA Left lower leg  5 x 3cm area of ecchymosis with 0.8 x 0.2 x 0.1 cm skin tear, moist, serous drainage, no pain, no odor.   Tx: Cleansed with wound cleanser, applied Mepitle One, covered with hydrogel and wrapped with rolled gauze.      Recommendations:    Left lower leg Cleansed with wound cleanser, apply Mepitle one, hydrogel and wrap with rolled gauze. Change every 48 hours.     Turn/reposition approximately every 2 hours  Offload heels with heels hanging off end of pillow at all times while in bed.  Sacral Foam dressing: lift to assess regularly; change as needed. Discontinue if incontinence is frequently soiling dressing.     Z-guard cream to buttocks and sacrum daily and as needed with incontinence care  Low Air Loss mattress: Use only flat sheet and one incontinence pad.     No concerns to relay to provider.    Transition of Care: Please re-consult if needed.     Yuni Rao RN  [facility] Inpatient Wound Care Department  Office [phone]  Available via [employer]

## 2024-07-15 NOTE — PROGRESS NOTES
[facility]  Good Help to Those in Need  [phone]     Patient Name: Tyrell Garrido  YOB: 1920  Age: 89 y.o.    [facility] RN Note:  Hospice consult received, reviewing chart. Will follow up with Unit Nurse and Care Manager to discuss plan of care, patient status and discharge disposition     Patient lives outside service area for [facility]. CM notified that referral will need to be sent to an alternate agency that can serve [place] for home services    Thank you for the opportunity to be of service to this patient.   Myriam Yuan, RN, BSN, CHPN  Clinical Nurse Liaison  [facility]  [phone] [place]  [phone] Office   Available on [employer]

## 2024-07-15 NOTE — PROGRESS NOTES
[facility] [place] [facility]                                                                               Hospitalist Progress Note  GABRIELE SULLIVAN MD          Date of Service:  7/15/2024  NAME:  Tyrell Garrido  :  1920  MRN:  [id number]    Please note that this dictation was completed with [employer], the computer voice recognition software.  Quite often unanticipated grammatical, syntax, homophones, and other interpretive errors are inadvertently transcribed by the computer software.  Please disregard these errors.  Please excuse any errors that have escaped final proofreading.    Admission Summary:   89 y.o.   male with PMHx hypertension and BPH who presents with shortness of breath and leg swelling suspicious for new onset CHF.              Interval history / Subjective:   Reporting ankle pain with PT      Assessment & Plan:         Anticipated discharge date :   Anticipated disposition : Home with    Barriers to discharge : medical stability        Acute hypoxic respiratory failure due to   Acute cardiogenic pulmonary edema likely acute on chronic CHF   Causing Dyspnea and bilateral pedal edema Acute, present on admission.  BNP elevated and CXR shows pulmonary edema.  Likely new onset CHF.ECHO showed preserved EF      -lasix 20 mg switched to oral   -cards following  -low sodium diet  -fluid restriction  -Telemetry  -Wean O2  -Strict I's and O's  - Daily weights     Left ankle wound   -wound care consulted    Left lower leg Cleansed with wound cleanser, apply Mepitle one, hydrogel and wrap with rolled gauze. Change every 48 hours.     B/L ankle pain   Xray bilateral ankles   PRN Tylenol     BPH: Chronic, controlled.  - Continue tamsulosin     Hypertension: Chronic, controlled.  Continue amlodipine        Code status: Full   Prophylaxis: enoxaparin sc   Care Plan discussed with: patient,family,  RN  Anticipated Disposition:   Inpatient  Cardiac monitoring:

## 2024-07-15 NOTE — CONSULTS
Nutrition Assessment     Type and Reason for Visit: Patient Education Consult for CHF    Nutrition Recommendations/Plan:   Provide Ensure High Protein once daily (160 kcal, 19 g carbs, 16 g protein)    Provide Gelatein once daily to increase kcal/protein intake (80 kcal, <1 g carbs, 20 g protein)   Bowel regimen adjustments as needed     Malnutrition Assessment:  Malnutrition Status: Mild / At risk for malnutrition   Nutrition focused physical exam completed by RD with results of mild protein-calorie malnutrition due to advanced age, weight loss ~10% from UBW over an unclear timeline and early satiety.      Nutrition Assessment:    89 year old male admitted for Heart failure, unspecified (HCC) [I50.9]  Shortness of breath [R06.02]  Hypoxemia [R09.02]  Acute pulmonary edema (HCC) [J81.0]  Acute congestive heart failure, unspecified heart failure type (HCC) [I50.9] who  has a past medical history of Cancer (HCC). Pt sitting up in chair in room and eating lunch when RD visited for assessment. Pt is [place], family (daughter) at bedside. He is eating fairly well, but feeling constipated. He usually takes Miralax at home and has been given this along with a stool softener. Pt requests some prune juice to try as well. He knows he's lost a little weight but doesn't have the appetite he used to have. He likes to eat and doesn't report difficulty chewing or swallowing. He is given a fluid restriction of 1500 mL, however daughter states he doesn't drink very much fluid most of the time. No added salt is appropriate and would not benefit from significant sodium restriction at this juncture. RD encouraged adequate fluid and protein intake.      Estimated Daily Nutrient Needs:  Energy (kcal):  1500 - 1683 kcal/d (25-28 kcal/kg) Weight Used for Energy Requirements: Current     Protein (g):  66 g/d (1.1 g/kg) Weight Used for Protein Requirements: Current        Fluid (ml/day):  1500 mL/d Method Used for Fluid Requirements: 1  MS  Contact via [employer] or office [phone]

## 2024-07-15 NOTE — CARE COORDINATION
07/15/24 1039   Service Assessment   Patient Orientation Alert and Oriented   Cognition Alert   History Provided By Child/Family  (Reynaldo Hobson)   Primary Caregiver Self   Support Systems Children   Patient's Healthcare Decision Maker is: Legal Next of Kin   PCP Verified by CM Yes   Last Visit to PCP Within last 6 months  (seen in January)   Prior Functional Level Independent in ADLs/IADLs   Current Functional Level Assistance with the following:;Bathing;Dressing;Toileting;Cooking;Housework;Shopping;Mobility   Can patient return to prior living arrangement Yes   Ability to make needs known: Good   Family able to assist with home care needs: Yes   Would you like for me to discuss the discharge plan with any other family members/significant others, and if so, who? Yes  (daughter Franchesca Hobson)   Social/Functional History   Lives With Son;Daughter  (\"I am never alone\")   Type of Home House   Home Layout One level   Home Access Level entry   Bathroom Shower/Tub Walk-in shower   Bathroom Equipment Grab bars around toilet;Grab bars in shower   Home Equipment Walker - Rolling   Receives Help From Family   Ambulation Assistance Independent   Transfer Assistance Independent   Active  No   Patient's  Info daughter or son inlaw provides transportation   Mode of Transportation Car   Occupation Retired     CM met with Pt and daughter at the bedside. Pt was alert and oriented x4 however was very hard of hearing. CM obtained information from Pt's daughter Franchesca. Pt is reported to live with Daughter and Son in-law at [address] [place] [address]. Pt has lived with daughter for about 10 years. At baseline Pt was independent at baseline only using a rolling walker while out in the community. Pt's daughter stated that the pt has access to a bedside commode if needed. Pt has no hx of HH or SNF. Pt is not on O2 at baseline.     Recommendation is for HH with increased supervision at this time. Family

## 2024-07-15 NOTE — PROGRESS NOTES
Spiritual Care Assessment/Progress Note  [facility]    Name: Tyrell Garrido MRN: [id number]    Age: 89 y.o.     Sex: male   Language: English     Date: 7/15/2024            Total Time Calculated: 50 min              Spiritual Assessment begun in [facility] B5 MULTI-SPECIALTY ONCOLOGY 1  Service Provided For: Patient and family together  Referral/Consult From: Rounding  Encounter Overview/Reason: Initial Encounter    Spiritual beliefs:      [] Involved in a thompson tradition/spiritual practice:      [x] Supported by a thompson community: Jehovah's witness Community      [] Claims no spiritual orientation:      [] Seeking spiritual identity:           [] Adheres to an individual form of spirituality:      [] Not able to assess:                Identified resources for coping and support system:   Support System: Family members, Scientology/thompson community       [] Prayer                  [] Devotional reading               [] Music                  [] Guided Imagery     [] Pet visits                                        [] Other: (COMMENT)     Specific area/focus of visit   Encounter:    Crisis:    Spiritual/Emotional needs: Type: Spiritual Support, Emotional Distress  Ritual, Rites and Sacraments:    Grief, Loss, and Adjustments:    Ethics/Mediation:    Behavioral Health:    Palliative Care:    Advance Care Planning:           Narrative:   Rounding, Oncology multispecialty Unit on the 5th floor. Mr. Garrido was with his physical therapist, his daughter and son in law. Physical Therapist was trying to make him walk but he was unable to walk. His family are his support, he lives with the daughter and son in law.   His daughter said he walked yesterday and today he is not, and this is a concern to the family. Mr. Garrido was anxious to go home today. He shared about his thompson, his gratitude to God, to his family and to all his loved ones. He seems to be serene, he said he prays to God, listen to him, what God says to him is

## 2024-07-15 NOTE — PLAN OF CARE
Problem: Occupational Therapy - Adult  Goal: By Discharge: Performs self-care activities at highest level of function for planned discharge setting.  See evaluation for individualized goals.  Description: FUNCTIONAL STATUS PRIOR TO ADMISSION:  Patient was ambulatory using a rolling walker without assist  Receives Help From: Family,  ,  ,  ,  ,  ,  ,  , Ambulation Assistance: Independent, Transfer Assistance: Independent,       HOME SUPPORT: Patient lived with daughter and son-in-law but didn't require assistance. Per family they are not able to physically assist him and son-in-law still works    Occupational Therapy Goals:  Initiated 7/15/2024  1.  Patient will perform static standing > or = 3 minutes with Supervision and < or = 1 UE support within 7 day(s).  2.  Patient will perform lower body dressing with Supervision within 7 day(s).  3.  Patient will perform upper body dressing with Modified [place] within 7 day(s).  4.  Patient will perform toilet transfers with Contact Guard Assist with rolling walker  within 7 day(s).  5.  Patient will tolerate > or = 5 minutes functional activity on room air and maintain sats > or = 90% within 7 day(s).  Outcome: Progressing     OCCUPATIONAL THERAPY EVALUATION    Patient: Tyrell Garrido (89 y.o. male)  Date: 7/15/2024  Primary Diagnosis: Heart failure, unspecified (HCC) [I50.9]  Shortness of breath [R06.02]  Hypoxemia [R09.02]  Acute pulmonary edema (HCC) [J81.0]  Acute congestive heart failure, unspecified heart failure type (HCC) [I50.9]         Precautions: Fall Risk, Skin                  ASSESSMENT :  The patient is limited by decreased functional mobility, independence in ADLs, high-level IADLs, ROM, strength, activity tolerance, safety awareness, cognition, command following, balance, increased pain levels, A-fib, tachy, and decreased oxygen to 84% with activity on room air .    Based on the impairments listed above patient primary complaint of pain in bilateral

## 2024-07-15 NOTE — PLAN OF CARE
Problem: Discharge Planning  Goal: Discharge to home or other facility with appropriate resources  7/14/2024 2219 by Jeannine Johnson RN  Outcome: Progressing  7/14/2024 1410 by Halley Cam RN  Outcome: Progressing     Problem: Safety - Adult  Goal: Free from fall injury  7/14/2024 2219 by Jeannine Johnson RN  Outcome: Progressing  7/14/2024 1410 by Halley Cam RN  Outcome: Progressing     Problem: Chronic Conditions and Co-morbidities  Goal: Patient's chronic conditions and co-morbidity symptoms are monitored and maintained or improved  7/14/2024 2219 by Jeannine Johnson RN  Outcome: Progressing  7/14/2024 1410 by Halley Cam RN  Outcome: Progressing     Problem: Physical Therapy - Adult  Goal: By Discharge: Performs mobility at highest level of function for planned discharge setting.  See evaluation for individualized goals.  Description: FUNCTIONAL STATUS PRIOR TO ADMISSION: Patient was modified independent using a rolling walker for functional mobility.    HOME SUPPORT PRIOR TO ADMISSION: The patient lived with daughter and son in law with 24/7 assistance available.    Physical Therapy Goals  Initiated 7/14/2024  1.  Patient will perform sit to stand with modified independence with NC line mgmt within 7 day(s).  2.  Patient will transfer from bed to chair and chair to bed with modified independence using the least restrictive device within 7 day(s).  3.  Patient will ambulate with modified independence for 250 feet with the least restrictive device within 7 day(s).       7/14/2024 1346 by Goldie Hudson, PT  Outcome: Progressing

## 2024-07-15 NOTE — PROGRESS NOTES
Physician Progress Note      PATIENT:               BAILEE ROSE  CSN #:                  [id number]  :                       1920  ADMIT DATE:       2024 2:50 PM  DISCH DATE:  RESPONDING  PROVIDER #:        Tyrese Contreras MD          QUERY TEXT:    Pt admitted with pulmonary edema and has CHF documented. If possible, please   document in progress notes and discharge summary further specificity regarding   the type and acuity of CHF:    The medical record reflects the following:  Risk Factors: 89 y.o.   male with PMHx hypertension and BPH who   presents with shortness of breath and leg swelling suspicious for new onset   CHF per H&P.  Clinical Indicators: H&P - shortness of breath and leg swelling:  Acute,   present on admission.  BNP elevated and CXR shows pulmonary edema.   Med: ECHO showed preserved EF. Left Ventricle: Low normal left   ventricular systolic function with a visually estimated EF of 50 - 55%. Left   ventricle size is normal. Mildly increased wall thickness. Normal wall motion.  Aortic Valve: Mild regurgitation.  Treatment: lasix 20 mg IV BID    Thank you,  Nani Pettit RN, CDI  [email or web address]  >  Options provided:  -- Acute on Chronic Systolic CHF/HFrEF  -- Acute on Chronic Diastolic CHF/HFpEF  -- Acute on Chronic Systolic and Diastolic CHF  -- Acute Systolic CHF/HFrEF  -- Acute Diastolic CHF/HFpEF  -- Acute Systolic and Diastolic CHF  -- Other - I will add my own diagnosis  -- Disagree - Not applicable / Not valid  -- Disagree - Clinically unable to determine / Unknown  -- Refer to Clinical Documentation Reviewer    PROVIDER RESPONSE TEXT:    This patient is in acute on chronic diastolic CHF/HFpEF.    Query created by: Nani Pettit on 7/15/2024 1:45 PM      Electronically signed by:  Tyrese Contreras MD 7/15/2024 5:02 PM

## 2024-07-15 NOTE — CARE COORDINATION
12pm: Referral sent to [facility] for an info session due to [facility] not servicing [facility].         CHRISTINE Ferrer, CM  [facility] Care Manager  [phone]

## 2024-07-16 ENCOUNTER — APPOINTMENT (OUTPATIENT)
Facility: HOSPITAL | Age: 89
End: 2024-07-16
Payer: MEDICARE

## 2024-07-16 LAB
ANION GAP SERPL CALC-SCNC: 6 MMOL/L (ref 5–15)
APPEARANCE UR: ABNORMAL
BACTERIA URNS QL MICRO: ABNORMAL /HPF
BASOPHILS # BLD: 0 K/UL (ref 0–0.1)
BASOPHILS NFR BLD: 0 % (ref 0–1)
BILIRUB UR QL: NEGATIVE
BUN SERPL-MCNC: 33 MG/DL (ref 6–20)
BUN/CREAT SERPL: 28 (ref 12–20)
CALCIUM SERPL-MCNC: 8.5 MG/DL (ref 8.5–10.1)
CHLORIDE SERPL-SCNC: 102 MMOL/L (ref 97–108)
CO2 SERPL-SCNC: 29 MMOL/L (ref 21–32)
COLOR UR: ABNORMAL
CREAT SERPL-MCNC: 1.17 MG/DL (ref 0.7–1.3)
DIFFERENTIAL METHOD BLD: ABNORMAL
EKG ATRIAL RATE: 87 BPM
EKG ATRIAL RATE: 92 BPM
EKG ATRIAL RATE: 93 BPM
EKG DIAGNOSIS: NORMAL
EKG P AXIS: 81 DEGREES
EKG P-R INTERVAL: 184 MS
EKG P-R INTERVAL: 184 MS
EKG P-R INTERVAL: 190 MS
EKG Q-T INTERVAL: 368 MS
EKG Q-T INTERVAL: 380 MS
EKG Q-T INTERVAL: 390 MS
EKG QRS DURATION: 108 MS
EKG QTC CALCULATION (BAZETT): 457 MS
EKG QTC CALCULATION (BAZETT): 469 MS
EKG QTC CALCULATION (BAZETT): 469 MS
EKG R AXIS: -59 DEGREES
EKG R AXIS: -60 DEGREES
EKG R AXIS: -60 DEGREES
EKG T AXIS: 86 DEGREES
EKG T AXIS: 87 DEGREES
EKG T AXIS: 93 DEGREES
EKG VENTRICULAR RATE: 87 BPM
EKG VENTRICULAR RATE: 92 BPM
EKG VENTRICULAR RATE: 93 BPM
EOSINOPHIL # BLD: 0 K/UL (ref 0–0.4)
EOSINOPHIL NFR BLD: 0 % (ref 0–7)
EPITH CASTS URNS QL MICRO: ABNORMAL /LPF
ERYTHROCYTE [DISTWIDTH] IN BLOOD BY AUTOMATED COUNT: 15.2 % (ref 11.5–14.5)
GLUCOSE SERPL-MCNC: 108 MG/DL (ref 65–100)
GLUCOSE UR STRIP.AUTO-MCNC: NEGATIVE MG/DL
HCT VFR BLD AUTO: 35 % (ref 36.6–50.3)
HGB BLD-MCNC: 11.5 G/DL (ref 12.1–17)
HGB UR QL STRIP: ABNORMAL
IMM GRANULOCYTES # BLD AUTO: 0.1 K/UL (ref 0–0.04)
IMM GRANULOCYTES NFR BLD AUTO: 1 % (ref 0–0.5)
KETONES UR QL STRIP.AUTO: ABNORMAL MG/DL
LEUKOCYTE ESTERASE UR QL STRIP.AUTO: ABNORMAL
LYMPHOCYTES # BLD: 1 K/UL (ref 0.8–3.5)
LYMPHOCYTES NFR BLD: 8 % (ref 12–49)
MAGNESIUM SERPL-MCNC: 1.8 MG/DL (ref 1.6–2.4)
MCH RBC QN AUTO: 31.2 PG (ref 26–34)
MCHC RBC AUTO-ENTMCNC: 32.9 G/DL (ref 30–36.5)
MCV RBC AUTO: 94.9 FL (ref 80–99)
MONOCYTES # BLD: 2.1 K/UL (ref 0–1)
MONOCYTES NFR BLD: 16 % (ref 5–13)
NEUTS SEG # BLD: 9.7 K/UL (ref 1.8–8)
NEUTS SEG NFR BLD: 75 % (ref 32–75)
NITRITE UR QL STRIP.AUTO: NEGATIVE
NRBC # BLD: 0 K/UL (ref 0–0.01)
NRBC BLD-RTO: 0 PER 100 WBC
PH UR STRIP: 5.5 (ref 5–8)
PHOSPHATE SERPL-MCNC: 3.5 MG/DL (ref 2.6–4.7)
PLATELET # BLD AUTO: 135 K/UL (ref 150–400)
PMV BLD AUTO: 12.7 FL (ref 8.9–12.9)
POTASSIUM SERPL-SCNC: 4.3 MMOL/L (ref 3.5–5.1)
PROCALCITONIN SERPL-MCNC: 1.01 NG/ML
PROT UR STRIP-MCNC: 100 MG/DL
RBC # BLD AUTO: 3.69 M/UL (ref 4.1–5.7)
RBC #/AREA URNS HPF: >100 /HPF (ref 0–5)
RBC MORPH BLD: ABNORMAL
SODIUM SERPL-SCNC: 137 MMOL/L (ref 136–145)
SP GR UR REFRACTOMETRY: 1.02 (ref 1–1.03)
SPECIMEN HOLD: NORMAL
URINE CULTURE IF INDICATED: ABNORMAL
UROBILINOGEN UR QL STRIP.AUTO: 1 EU/DL (ref 0.2–1)
WBC # BLD AUTO: 12.9 K/UL (ref 4.1–11.1)
WBC URNS QL MICRO: ABNORMAL /HPF (ref 0–4)

## 2024-07-16 PROCEDURE — 94761 N-INVAS EAR/PLS OXIMETRY MLT: CPT

## 2024-07-16 PROCEDURE — 85025 COMPLETE CBC W/AUTO DIFF WBC: CPT

## 2024-07-16 PROCEDURE — 83735 ASSAY OF MAGNESIUM: CPT

## 2024-07-16 PROCEDURE — 6370000000 HC RX 637 (ALT 250 FOR IP): Performed by: STUDENT IN AN ORGANIZED HEALTH CARE EDUCATION/TRAINING PROGRAM

## 2024-07-16 PROCEDURE — 87040 BLOOD CULTURE FOR BACTERIA: CPT

## 2024-07-16 PROCEDURE — 93010 ELECTROCARDIOGRAM REPORT: CPT | Performed by: INTERNAL MEDICINE

## 2024-07-16 PROCEDURE — 36415 COLL VENOUS BLD VENIPUNCTURE: CPT

## 2024-07-16 PROCEDURE — 2700000000 HC OXYGEN THERAPY PER DAY

## 2024-07-16 PROCEDURE — 6370000000 HC RX 637 (ALT 250 FOR IP): Performed by: FAMILY MEDICINE

## 2024-07-16 PROCEDURE — 6370000000 HC RX 637 (ALT 250 FOR IP)

## 2024-07-16 PROCEDURE — 71045 X-RAY EXAM CHEST 1 VIEW: CPT

## 2024-07-16 PROCEDURE — 1100000000 HC RM PRIVATE

## 2024-07-16 PROCEDURE — 6360000002 HC RX W HCPCS: Performed by: STUDENT IN AN ORGANIZED HEALTH CARE EDUCATION/TRAINING PROGRAM

## 2024-07-16 PROCEDURE — 84145 PROCALCITONIN (PCT): CPT

## 2024-07-16 PROCEDURE — 81001 URINALYSIS AUTO W/SCOPE: CPT

## 2024-07-16 PROCEDURE — 2580000003 HC RX 258: Performed by: STUDENT IN AN ORGANIZED HEALTH CARE EDUCATION/TRAINING PROGRAM

## 2024-07-16 PROCEDURE — 2580000003 HC RX 258: Performed by: FAMILY MEDICINE

## 2024-07-16 PROCEDURE — 84100 ASSAY OF PHOSPHORUS: CPT

## 2024-07-16 PROCEDURE — 80048 BASIC METABOLIC PNL TOTAL CA: CPT

## 2024-07-16 RX ORDER — BISACODYL 5 MG/1
5 TABLET, DELAYED RELEASE ORAL DAILY PRN
Status: DISCONTINUED | OUTPATIENT
Start: 2024-07-16 | End: 2024-07-18 | Stop reason: HOSPADM

## 2024-07-16 RX ORDER — ACETAMINOPHEN 325 MG/1
650 TABLET ORAL EVERY 4 HOURS PRN
Status: DISCONTINUED | OUTPATIENT
Start: 2024-07-16 | End: 2024-07-18 | Stop reason: HOSPADM

## 2024-07-16 RX ORDER — LOPERAMIDE HYDROCHLORIDE 2 MG/1
2 CAPSULE ORAL PRN
Status: DISCONTINUED | OUTPATIENT
Start: 2024-07-16 | End: 2024-07-18 | Stop reason: HOSPADM

## 2024-07-16 RX ORDER — OXYCODONE HCL 20 MG/ML
5 CONCENTRATE, ORAL ORAL
Status: DISCONTINUED | OUTPATIENT
Start: 2024-07-16 | End: 2024-07-18 | Stop reason: HOSPADM

## 2024-07-16 RX ORDER — ENEMA 19; 7 G/133ML; G/133ML
1 ENEMA RECTAL DAILY PRN
Status: DISCONTINUED | OUTPATIENT
Start: 2024-07-16 | End: 2024-07-18 | Stop reason: HOSPADM

## 2024-07-16 RX ORDER — ATROPINE SULFATE 10 MG/ML
2 SOLUTION/ DROPS OPHTHALMIC EVERY 4 HOURS PRN
Status: DISCONTINUED | OUTPATIENT
Start: 2024-07-16 | End: 2024-07-18 | Stop reason: HOSPADM

## 2024-07-16 RX ORDER — ONDANSETRON 4 MG/1
4 TABLET, ORALLY DISINTEGRATING ORAL EVERY 6 HOURS PRN
Status: DISCONTINUED | OUTPATIENT
Start: 2024-07-16 | End: 2024-07-18 | Stop reason: HOSPADM

## 2024-07-16 RX ORDER — LIDOCAINE 4 G/G
1 PATCH TOPICAL DAILY
Status: DISCONTINUED | OUTPATIENT
Start: 2024-07-16 | End: 2024-07-18 | Stop reason: HOSPADM

## 2024-07-16 RX ORDER — LORAZEPAM 2 MG/ML
1 CONCENTRATE ORAL
Status: DISCONTINUED | OUTPATIENT
Start: 2024-07-16 | End: 2024-07-18 | Stop reason: HOSPADM

## 2024-07-16 RX ORDER — OXYCODONE HYDROCHLORIDE 5 MG/1
5 TABLET ORAL EVERY 4 HOURS PRN
Status: DISCONTINUED | OUTPATIENT
Start: 2024-07-16 | End: 2024-07-16

## 2024-07-16 RX ADMIN — FUROSEMIDE 20 MG: 20 TABLET ORAL at 08:57

## 2024-07-16 RX ADMIN — TAMSULOSIN HYDROCHLORIDE 0.4 MG: 0.4 CAPSULE ORAL at 08:57

## 2024-07-16 RX ADMIN — SODIUM CHLORIDE, PRESERVATIVE FREE 10 ML: 5 INJECTION INTRAVENOUS at 21:16

## 2024-07-16 RX ADMIN — DICLOFENAC SODIUM 2 G: 10 GEL TOPICAL at 22:00

## 2024-07-16 RX ADMIN — WATER 1000 MG: 1 INJECTION INTRAMUSCULAR; INTRAVENOUS; SUBCUTANEOUS at 13:02

## 2024-07-16 RX ADMIN — POLYETHYLENE GLYCOL 3350 17 G: 17 POWDER, FOR SOLUTION ORAL at 08:58

## 2024-07-16 RX ADMIN — AMLODIPINE BESYLATE 5 MG: 5 TABLET ORAL at 08:57

## 2024-07-16 RX ADMIN — LORAZEPAM 1 MG: 2 SOLUTION, CONCENTRATE ORAL at 21:15

## 2024-07-16 RX ADMIN — SENNOSIDES AND DOCUSATE SODIUM 2 TABLET: 50; 8.6 TABLET ORAL at 08:57

## 2024-07-16 RX ADMIN — ACETAMINOPHEN 650 MG: 325 TABLET ORAL at 12:41

## 2024-07-16 RX ADMIN — ENOXAPARIN SODIUM 30 MG: 100 INJECTION SUBCUTANEOUS at 08:56

## 2024-07-16 RX ADMIN — SODIUM CHLORIDE, PRESERVATIVE FREE 10 ML: 5 INJECTION INTRAVENOUS at 08:58

## 2024-07-16 ASSESSMENT — PAIN - FUNCTIONAL ASSESSMENT
PAIN_FUNCTIONAL_ASSESSMENT: PREVENTS OR INTERFERES SOME ACTIVE ACTIVITIES AND ADLS
PAIN_FUNCTIONAL_ASSESSMENT: NONE - DENIES PAIN

## 2024-07-16 ASSESSMENT — PAIN SCALES - GENERAL
PAINLEVEL_OUTOF10: 0
PAINLEVEL_OUTOF10: 6
PAINLEVEL_OUTOF10: 0

## 2024-07-16 ASSESSMENT — PAIN DESCRIPTION - LOCATION: LOCATION: KNEE

## 2024-07-16 ASSESSMENT — PAIN DESCRIPTION - DESCRIPTORS: DESCRIPTORS: ACHING

## 2024-07-16 ASSESSMENT — PAIN DESCRIPTION - ORIENTATION: ORIENTATION: RIGHT;LEFT

## 2024-07-16 NOTE — CARE COORDINATION
Care Management Progress Note    Reason for Admission:   Heart failure, unspecified (HCC) [I50.9]  Shortness of breath [R06.02]  Hypoxemia [R09.02]  Acute pulmonary edema (HCC) [J81.0]  Acute congestive heart failure, unspecified heart failure type (HCC) [I50.9]         Patient Admission Status: Inpatient  RUR: Readmission Risk Score: 12.4        Transition of care plan:  Home with hospice ([facility])   Awaiting DME delivery   BLS vs wheelchair transport          CM met with Pt , daughter, and son in-law after hospice info session. CM was informed by family that they would like to pursue hospice at this time.  CM sent Hospice orders to [facility].     Pt will need equipment arranged at their residence.             CHRISTINE Ferrer, ASAEL  [facility] Care Manager  [phone]

## 2024-07-16 NOTE — PROGRESS NOTES
Physician Progress Note      PATIENT:               BAILEE ROSE  CSN #:                  [id number]  :                       1920  ADMIT DATE:       2024 2:50 PM  DISCH DATE:  RESPONDING  PROVIDER #:        Tyrese Contreras MD          QUERY TEXT:    Pt admitted with CHF and has malnutrition documented in7/15 RD note. Please   further specify type of malnutrition with documentation in the medical record.    The medical record reflects the following:  Risk Factors: 89 y.o. male with PMHx hypertension and BPH who presents with   shortness of breath and leg swelling suspicious for new onset CHF.  Clinical Indicators: Per 7/15 RD - Malnutrition Status: Mild / At risk for   malnutrition; Nutrition focused physical exam completed by RD with results of   mild protein-calorie malnutrition due to advanced age, weight loss   10% from UBW over an unclear timeline and early satiety.  Treatment: RD following    Thank you,  Krupa Pettit RN, CDI  [email or web address]  >  Options provided:  -- Mild Malnutrition  -- Other - I will add my own diagnosis  -- Disagree - Not applicable / Not valid  -- Disagree - Clinically unable to determine / Unknown  -- Refer to Clinical Documentation Reviewer    PROVIDER RESPONSE TEXT:    This patient has mild malnutrition.    Query created by: Krupa Pettit on 2024 8:22 AM      Electronically signed by:  Tyrese Contreras MD 2024 8:29 AM

## 2024-07-16 NOTE — ACP (ADVANCE CARE PLANNING)
Advance Care Planning (ACP) Provider Note - Comprehensive     Date of ACP Conversation: 07/16/24  Persons included in Conversation:  patient and family  Length of ACP Conversation in minutes:  20 minutes      Diagnosis CHF  Authorized Decision Maker (if patient is incapable of making informed decisions):   This person is:  Next of Kin by law (only applies in absence of above)  [place] ACP for ALL Patients with Decision Making Capacity:   Importance of advance care planning, including choosing a healthcare agent to communicate patient's healthcare decisions if patient lost the ability to make decisions, such as after a sudden illness or accident  Understanding of the healthcare agent role was assessed and information provided  Exploration of values, goals, and preferences if recovery is not expected, even with continued medical treatment in the event of: Imminent death  Severe, permanent brain injury  \"In these circumstances, what matters most to you?\"  Care focused more on comfort or quality of life.        For Serious or Chronic Illness:  Family has met with hospice this morning and just need to call back to complete paperwork. Son in law is surprised with rapid decline in general condition and mobility since admission to hospital. Discussed that there is often an accelerated decline after an acute illness in elderly population.   Daughter and son in law are also concerned about equipment and home living arrangements    Interventions Provided:  Transition to comfort measures

## 2024-07-16 NOTE — PLAN OF CARE
Problem: Discharge Planning  Goal: Discharge to home or other facility with appropriate resources  Outcome: Progressing     Problem: Safety - Adult  Goal: Free from fall injury  Outcome: Progressing     Problem: Chronic Conditions and Co-morbidities  Goal: Patient's chronic conditions and co-morbidity symptoms are monitored and maintained or improved  Outcome: Progressing     Problem: Respiratory - Adult  Goal: Achieves optimal ventilation and oxygenation  Outcome: Progressing     Problem: Occupational Therapy - Adult  Goal: By Discharge: Performs self-care activities at highest level of function for planned discharge setting.  See evaluation for individualized goals.  Description: FUNCTIONAL STATUS PRIOR TO ADMISSION:  Patient was ambulatory using a rolling walker without assist  Receives Help From: Family,  ,  ,  ,  ,  ,  ,  , Ambulation Assistance: Independent, Transfer Assistance: Independent,       HOME SUPPORT: Patient lived with daughter and son-in-law but didn't require assistance. Per family they are not able to physically assist him and son-in-law still works    Occupational Therapy Goals:  Initiated 7/15/2024  1.  Patient will perform static standing > or = 3 minutes with Supervision and < or = 1 UE support within 7 day(s).  2.  Patient will perform lower body dressing with Supervision within 7 day(s).  3.  Patient will perform upper body dressing with Modified [place] within 7 day(s).  4.  Patient will perform toilet transfers with Contact Guard Assist with rolling walker  within 7 day(s).  5.  Patient will tolerate > or = 5 minutes functional activity on room air and maintain sats > or = 90% within 7 day(s).  7/15/2024 1033 by Susu Monsivais, OTR/L  Outcome: Progressing     Problem: Nutrition Deficit:  Goal: Optimize nutritional status  Outcome: Progressing     Problem: Pain  Goal: Verbalizes/displays adequate comfort level or baseline comfort level  Outcome: Progressing

## 2024-07-16 NOTE — PROGRESS NOTES
[facility]  [facility]  [phone]    Nursing Note   Patient Name: Tyrell Garrido  YOB: 1920  Age: 89 y.o.    [facility] RN Note:  Chart reviewed.     Per conversation with nurse, patient's family is still planning on taking him home with hospice agency that covers their area. We will be available to follow him until discharge and assess if his condition becomes unstable for safe discharge home.      Sheyla Belcher RN, [facility]  Hospice Nurse Liaison  [phone] [place]  [phone] Office

## 2024-07-16 NOTE — PROGRESS NOTES
Phlebotomy collected blood cx x2 and procalcitonin; nursing staff to collect UA when pt voids and then give IV abx. Plan of care being followed accordingly. MD aware.

## 2024-07-16 NOTE — PROGRESS NOTES
Physical/Occupational therapy:    Attempted PT/OT session. Spoke with RN and reported pt's BP is low and is very lethargic. Advised to defer therapy at this time.     Zuly Shah, PT,DPT

## 2024-07-16 NOTE — PROGRESS NOTES
Occupational Therapy Note:Spoke to PT who stated to hold at this time due pt's BP is low and is very lethargic .

## 2024-07-17 LAB
ANION GAP SERPL CALC-SCNC: 4 MMOL/L (ref 5–15)
BASOPHILS # BLD: 0 K/UL (ref 0–0.1)
BASOPHILS NFR BLD: 0 % (ref 0–1)
BUN SERPL-MCNC: 40 MG/DL (ref 6–20)
BUN/CREAT SERPL: 37 (ref 12–20)
CALCIUM SERPL-MCNC: 8.9 MG/DL (ref 8.5–10.1)
CHLORIDE SERPL-SCNC: 103 MMOL/L (ref 97–108)
CO2 SERPL-SCNC: 29 MMOL/L (ref 21–32)
CREAT SERPL-MCNC: 1.08 MG/DL (ref 0.7–1.3)
DIFFERENTIAL METHOD BLD: ABNORMAL
EOSINOPHIL # BLD: 0 K/UL (ref 0–0.4)
EOSINOPHIL NFR BLD: 0 % (ref 0–7)
ERYTHROCYTE [DISTWIDTH] IN BLOOD BY AUTOMATED COUNT: 15.5 % (ref 11.5–14.5)
GLUCOSE SERPL-MCNC: 113 MG/DL (ref 65–100)
HCT VFR BLD AUTO: 34.5 % (ref 36.6–50.3)
HGB BLD-MCNC: 11.4 G/DL (ref 12.1–17)
IMM GRANULOCYTES # BLD AUTO: 0.1 K/UL (ref 0–0.04)
IMM GRANULOCYTES NFR BLD AUTO: 1 % (ref 0–0.5)
LYMPHOCYTES # BLD: 0.7 K/UL (ref 0.8–3.5)
LYMPHOCYTES NFR BLD: 7 % (ref 12–49)
MAGNESIUM SERPL-MCNC: 2.1 MG/DL (ref 1.6–2.4)
MCH RBC QN AUTO: 31 PG (ref 26–34)
MCHC RBC AUTO-ENTMCNC: 33 G/DL (ref 30–36.5)
MCV RBC AUTO: 93.8 FL (ref 80–99)
MONOCYTES # BLD: 1.5 K/UL (ref 0–1)
MONOCYTES NFR BLD: 14 % (ref 5–13)
NEUTS SEG # BLD: 8.1 K/UL (ref 1.8–8)
NEUTS SEG NFR BLD: 78 % (ref 32–75)
NRBC # BLD: 0 K/UL (ref 0–0.01)
NRBC BLD-RTO: 0 PER 100 WBC
PHOSPHATE SERPL-MCNC: 3.2 MG/DL (ref 2.6–4.7)
PLATELET # BLD AUTO: 142 K/UL (ref 150–400)
PMV BLD AUTO: 12.6 FL (ref 8.9–12.9)
POTASSIUM SERPL-SCNC: 4 MMOL/L (ref 3.5–5.1)
RBC # BLD AUTO: 3.68 M/UL (ref 4.1–5.7)
RBC MORPH BLD: ABNORMAL
SODIUM SERPL-SCNC: 136 MMOL/L (ref 136–145)
WBC # BLD AUTO: 10.4 K/UL (ref 4.1–11.1)

## 2024-07-17 PROCEDURE — 80048 BASIC METABOLIC PNL TOTAL CA: CPT

## 2024-07-17 PROCEDURE — 6370000000 HC RX 637 (ALT 250 FOR IP)

## 2024-07-17 PROCEDURE — 2700000000 HC OXYGEN THERAPY PER DAY

## 2024-07-17 PROCEDURE — 83735 ASSAY OF MAGNESIUM: CPT

## 2024-07-17 PROCEDURE — 1100000000 HC RM PRIVATE

## 2024-07-17 PROCEDURE — 2580000003 HC RX 258: Performed by: FAMILY MEDICINE

## 2024-07-17 PROCEDURE — 6370000000 HC RX 637 (ALT 250 FOR IP): Performed by: STUDENT IN AN ORGANIZED HEALTH CARE EDUCATION/TRAINING PROGRAM

## 2024-07-17 PROCEDURE — 6370000000 HC RX 637 (ALT 250 FOR IP): Performed by: FAMILY MEDICINE

## 2024-07-17 PROCEDURE — 85025 COMPLETE CBC W/AUTO DIFF WBC: CPT

## 2024-07-17 PROCEDURE — 84100 ASSAY OF PHOSPHORUS: CPT

## 2024-07-17 PROCEDURE — 97530 THERAPEUTIC ACTIVITIES: CPT

## 2024-07-17 PROCEDURE — 94761 N-INVAS EAR/PLS OXIMETRY MLT: CPT

## 2024-07-17 PROCEDURE — 36415 COLL VENOUS BLD VENIPUNCTURE: CPT

## 2024-07-17 PROCEDURE — 97535 SELF CARE MNGMENT TRAINING: CPT

## 2024-07-17 RX ORDER — FUROSEMIDE 20 MG/1
20 TABLET ORAL DAILY
Qty: 60 TABLET | Refills: 3 | Status: SHIPPED | OUTPATIENT
Start: 2024-07-18

## 2024-07-17 RX ADMIN — SODIUM CHLORIDE, PRESERVATIVE FREE 10 ML: 5 INJECTION INTRAVENOUS at 19:59

## 2024-07-17 RX ADMIN — TAMSULOSIN HYDROCHLORIDE 0.4 MG: 0.4 CAPSULE ORAL at 09:07

## 2024-07-17 RX ADMIN — SODIUM CHLORIDE, PRESERVATIVE FREE 10 ML: 5 INJECTION INTRAVENOUS at 09:07

## 2024-07-17 RX ADMIN — DICLOFENAC SODIUM 2 G: 10 GEL TOPICAL at 09:07

## 2024-07-17 RX ADMIN — FUROSEMIDE 20 MG: 20 TABLET ORAL at 09:07

## 2024-07-17 RX ADMIN — SENNOSIDES AND DOCUSATE SODIUM 2 TABLET: 50; 8.6 TABLET ORAL at 09:07

## 2024-07-17 RX ADMIN — POLYETHYLENE GLYCOL 3350 17 G: 17 POWDER, FOR SOLUTION ORAL at 09:07

## 2024-07-17 RX ADMIN — DICLOFENAC SODIUM 2 G: 10 GEL TOPICAL at 20:43

## 2024-07-17 ASSESSMENT — PAIN SCALES - GENERAL
PAINLEVEL_OUTOF10: 0

## 2024-07-17 NOTE — PLAN OF CARE
Problem: Occupational Therapy - Adult  Goal: By Discharge: Performs self-care activities at highest level of function for planned discharge setting.  See evaluation for individualized goals.  Description: FUNCTIONAL STATUS PRIOR TO ADMISSION:  Patient was ambulatory using a rolling walker without assist  Receives Help From: Family,  ,  ,  ,  ,  ,  ,  , Ambulation Assistance: Independent, Transfer Assistance: Independent,       HOME SUPPORT: Patient lived with daughter and son-in-law but didn't require assistance. Per family they are not able to physically assist him and son-in-law still works    Occupational Therapy Goals:  Initiated 7/15/2024  1.  Patient will perform static standing > or = 3 minutes with Supervision and < or = 1 UE support within 7 day(s).  2.  Patient will perform lower body dressing with Supervision within 7 day(s).  3.  Patient will perform upper body dressing with Modified [place] within 7 day(s).  4.  Patient will perform toilet transfers with Contact Guard Assist with rolling walker  within 7 day(s).  5.  Patient will tolerate > or = 5 minutes functional activity on room air and maintain sats > or = 90% within 7 day(s).  Outcome: HH/HSPC Resolved Not Met    OCCUPATIONAL THERAPY TREATMENT/DISCHARGE  Patient: Tyrell Garrido (89 y.o. male)  Date: 7/17/2024  Primary Diagnosis: Heart failure, unspecified (HCC) [I50.9]  Shortness of breath [R06.02]  Hypoxemia [R09.02]  Acute pulmonary edema (HCC) [J81.0]  Acute congestive heart failure, unspecified heart failure type (HCC) [I50.9]       Precautions: Fall Risk, Skin                  Chart, occupational therapy assessment, plan of care, and goals were reviewed.    ASSESSMENT  Mr. Garrido was seen today for OT tx per family/MD request prior to discharge home with hospice services.  Patient was received in bed alert, oriented x4, and highly motivated for OOB activity.  Patient performed bed mobility, multiple sit to stand transfers, ambulation  assistance  LE Dressing Skilled Clinical Factors: difficulty maintaining reach to distal LE; increased assist when standing to complete task/pull up over hips    Toileting: Minimal assistance  Toileting Skilled Clinical Factors: SBA, hygiene; min A for clothing management      Activity Tolerance:   Good and Fair   Please refer to the flowsheet for vital signs taken during this treatment.    After treatment:   Patient left in no apparent distress sitting up in chair, Call bell within reach, Bed/ chair alarm activated, and Caregiver / family present    COMMUNICATION/EDUCATION:   The patient's plan of care was discussed with: registered nurse and patient and family.          Thank you for this referral.  Noemy Maharaj, OTR/L  Minutes: 45

## 2024-07-17 NOTE — CARE COORDINATION
Care Management Progress Note    Reason for Admission:   Heart failure, unspecified (HCC) [I50.9]  Shortness of breath [R06.02]  Hypoxemia [R09.02]  Acute pulmonary edema (HCC) [J81.0]  Acute congestive heart failure, unspecified heart failure type (HCC) [I50.9]         Patient Admission Status: Inpatient  RUR: Readmission Risk Score: 11.7    Hospitalization in the last 30 days (Readmission):  No          Transition of care plan:  Home with hospice ([facility])   Awaiting DME delivery   BLS vs wheelchair transport         CM spoke with liaison with [facility] hospice. CM was informed that DME was scheduled to be delivered today. However they would not be able to admit the Pt until tomorrow. CM inquired about admit time for tomorrow. ASAEL was informed that liaison would need to follow-up with admitting nursing to confirm a time.     CM contacted Pt's daughter to provide an update. CM was informed by the daughter that they were not anticipating discharge today. Cm inquired about if DME had been delivered to the home, Pt daughter stated that she had not received any correspondence from hospice regarding DME delivery for today.     ASAEL notified attending           CHRISTINE Ferrer, ASAEL [facility] Care Manager  [phone]

## 2024-07-17 NOTE — DISCHARGE INSTRUCTIONS
- Follow up Hospice for further care  - I have added lasix to keep fluid down and help you breath better

## 2024-07-17 NOTE — DISCHARGE SUMMARY
Discharge Summary   Please note that this dictation was completed with [employer], the computer voice recognition software.  Quite often unanticipated grammatical, syntax, homophones, and other interpretive errors are inadvertently transcribed by the computer software.  Please disregard these errors.  Please excuse any errors that have escaped final proofreading.    PATIENT ID: Tyrell Garrido  MRN: [id number]   YOB: 1920    DATE OF ADMISSION: 7/12/2024  2:50 PM    DATE OF DISCHARGE: 7/17/2024  PRIMARY CARE PROVIDER: Catracho Brantley MD         ATTENDING PHYSICIAN: Ronaldo Bowman MD  DISCHARGING PROVIDER: Ronaldo Bowman MD       CONSULTATIONS: IP CONSULT TO DIETITIAN  IP CONSULT TO CARDIOLOGY  IP WOUND CARE NURSE CONSULT TO EVAL  IP WOUND CARE NURSE CONSULT TO EVAL  IP CONSULT TO CASE MANAGEMENT  IP CONSULT TO HOSPICE    PROCEDURES/SURGERIES: * No surgery found *    ADMITTING HPI from excerpted H&P     89 y.o.  male with PMHx hypertension and BPH who presents with shortness of breath and leg swelling suspicious for new onset CHF.         HOSPITAL COURSE & DISCHARGE DIAGNOSIS/ PLAN:     Heart failure with preserved ejection fraction  Pulm edema due to above  Respiratory failure due to above  Left ankle wound  BPH  Hypertension  Transition to hospice  Patient was discharged on hospice and follow-up with hospice further management              PENDING TEST RESULTS:   At the time of discharge the following test results are still pending: None    FOLLOW UP APPOINTMENTS:    [unfilled]     ADDITIONAL CARE RECOMMENDATIONS: Follow-up with hospice    DIET: regular diet    ACTIVITY: activity as tolerated    WOUND CARE: Per hospice    EQUIPMENT needed: Per hospice      DISCHARGE MEDICATIONS:     Medication List        START taking these medications      furosemide 20 MG tablet  Commonly known as: LASIX  Take 1 tablet by mouth daily  Start taking on: July 18, 2024            CONTINUE taking these

## 2024-07-17 NOTE — PLAN OF CARE
Problem: Discharge Planning  Goal: Discharge to home or other facility with appropriate resources  7/17/2024 1055 by Abbey Fierro RN  Outcome: Progressing  7/17/2024 0921 by Paulo Hassan RN  Outcome: Progressing  Flowsheets (Taken 7/16/2024 2100)  Discharge to home or other facility with appropriate resources:   Identify barriers to discharge with patient and caregiver   Identify discharge learning needs (meds, wound care, etc)     Problem: Chronic Conditions and Co-morbidities  Goal: Patient's chronic conditions and co-morbidity symptoms are monitored and maintained or improved  7/17/2024 1055 by Abbey Fierro RN  Outcome: Progressing  7/17/2024 0921 by Paulo Hassan RN  Outcome: Progressing  Flowsheets (Taken 7/16/2024 2100)  Care Plan - Patient's Chronic Conditions and Co-Morbidity Symptoms are Monitored and Maintained or Improved:   Monitor and assess patient's chronic conditions and comorbid symptoms for stability, deterioration, or improvement   Collaborate with multidisciplinary team to address chronic and comorbid conditions and prevent exacerbation or deterioration   Update acute care plan with appropriate goals if chronic or comorbid symptoms are exacerbated and prevent overall improvement and discharge     Problem: Respiratory - Adult  Goal: Achieves optimal ventilation and oxygenation  7/17/2024 1055 by Abbey Fierro RN  Outcome: Progressing  7/17/2024 0921 by Paulo Hassan RN  Outcome: Progressing  Flowsheets (Taken 7/16/2024 2100)  Achieves optimal ventilation and oxygenation:   Assess for changes in respiratory status   Position to facilitate oxygenation and minimize respiratory effort   Oxygen supplementation based on oxygen saturation or arterial blood gases     Problem: Nutrition Deficit:  Goal: Optimize nutritional status  7/17/2024 1055 by Abbey Fierro RN  Outcome: Progressing  7/17/2024 0921 by Paulo Hassan RN  Outcome: Not Progressing     Problem: Pain  Goal:

## 2024-07-17 NOTE — PLAN OF CARE
Problem: Nutrition Deficit:  Goal: Optimize nutritional status  Outcome: Not Progressing     Problem: Discharge Planning  Goal: Discharge to home or other facility with appropriate resources  Outcome: Progressing  Discharge to home or other facility with appropriate resources:   Identify barriers to discharge with patient and caregiver   Identify discharge learning needs (meds, wound care, etc)     Problem: Safety - Adult  Goal: Free from fall injury  Outcome: Progressing      Problem: Chronic Conditions and Co-morbidities  Goal: Patient's chronic conditions and co-morbidity symptoms are monitored and maintained or improved  Outcome: Progressing  Flowsheets (Taken 7/16/2024 2100)  Care Plan - Patient's Chronic Conditions and Co-Morbidity Symptoms are Monitored and Maintained or Improved:   Monitor and assess patient's chronic conditions and comorbid symptoms for stability, deterioration, or improvement   Collaborate with multidisciplinary team to address chronic and comorbid conditions and prevent exacerbation or deterioration   Update acute care plan with appropriate goals if chronic or comorbid symptoms are exacerbated and prevent overall improvement and discharge  Outcome: Progressing    Problem: Respiratory - Adult  Goal: Achieves optimal ventilation and oxygenation  Outcome: Progressing  Flowsheets (Taken 7/16/2024 2100)  Achieves optimal ventilation and oxygenation:   Assess for changes in respiratory status   Position to facilitate oxygenation and minimize respiratory effort   Oxygen supplementation based on oxygen saturation or arterial blood gases  Outcome: Progressing

## 2024-07-18 VITALS
HEIGHT: 68 IN | HEART RATE: 104 BPM | DIASTOLIC BLOOD PRESSURE: 78 MMHG | SYSTOLIC BLOOD PRESSURE: 113 MMHG | BODY MASS INDEX: 20 KG/M2 | WEIGHT: 132 LBS | OXYGEN SATURATION: 95 % | RESPIRATION RATE: 16 BRPM | TEMPERATURE: 99 F

## 2024-07-18 PROCEDURE — 6370000000 HC RX 637 (ALT 250 FOR IP): Performed by: STUDENT IN AN ORGANIZED HEALTH CARE EDUCATION/TRAINING PROGRAM

## 2024-07-18 PROCEDURE — 6370000000 HC RX 637 (ALT 250 FOR IP)

## 2024-07-18 PROCEDURE — 6370000000 HC RX 637 (ALT 250 FOR IP): Performed by: FAMILY MEDICINE

## 2024-07-18 PROCEDURE — 94761 N-INVAS EAR/PLS OXIMETRY MLT: CPT

## 2024-07-18 PROCEDURE — 2580000003 HC RX 258: Performed by: FAMILY MEDICINE

## 2024-07-18 RX ADMIN — TAMSULOSIN HYDROCHLORIDE 0.4 MG: 0.4 CAPSULE ORAL at 09:13

## 2024-07-18 RX ADMIN — SENNOSIDES AND DOCUSATE SODIUM 2 TABLET: 50; 8.6 TABLET ORAL at 09:13

## 2024-07-18 RX ADMIN — POLYETHYLENE GLYCOL 3350 17 G: 17 POWDER, FOR SOLUTION ORAL at 09:15

## 2024-07-18 RX ADMIN — FUROSEMIDE 20 MG: 20 TABLET ORAL at 09:13

## 2024-07-18 RX ADMIN — POLYETHYLENE GLYCOL 3350 17 G: 17 POWDER, FOR SOLUTION ORAL at 09:14

## 2024-07-18 RX ADMIN — SODIUM CHLORIDE, PRESERVATIVE FREE 10 ML: 5 INJECTION INTRAVENOUS at 09:15

## 2024-07-18 ASSESSMENT — PAIN SCALES - GENERAL
PAINLEVEL_OUTOF10: 0

## 2024-07-18 NOTE — PLAN OF CARE
Problem: Occupational Therapy - Adult  Goal: By Discharge: Performs self-care activities at highest level of function for planned discharge setting.  See evaluation for individualized goals.  Description: FUNCTIONAL STATUS PRIOR TO ADMISSION:  Patient was ambulatory using a rolling walker without assist  Receives Help From: Family,  ,  ,  ,  ,  ,  ,  , Ambulation Assistance: Independent, Transfer Assistance: Independent,       HOME SUPPORT: Patient lived with daughter and son-in-law but didn't require assistance. Per family they are not able to physically assist him and son-in-law still works    Occupational Therapy Goals:  Initiated 7/15/2024  1.  Patient will perform static standing > or = 3 minutes with Supervision and < or = 1 UE support within 7 day(s).  2.  Patient will perform lower body dressing with Supervision within 7 day(s).  3.  Patient will perform upper body dressing with Modified [place] within 7 day(s).  4.  Patient will perform toilet transfers with Contact Guard Assist with rolling walker  within 7 day(s).  5.  Patient will tolerate > or = 5 minutes functional activity on room air and maintain sats > or = 90% within 7 day(s).  7/17/2024 1501 by Noemy Maharaj OT  Outcome: /[facility] Resolved Not Met     Problem: Nutrition Deficit:  Goal: Optimize nutritional status  7/17/2024 1055 by Abbey Fierro, RN  Outcome: Progressing  7/17/2024 0921 by Paulo Hassan, RN  Outcome: Not Progressing

## 2024-07-18 NOTE — PROGRESS NOTES
Chart reviewed. Noted plan to discharge with hospice services. No aggressive nutrition interventions needed at this time. Provide PO for comfort/as tolerated. Will assist as needed.    Diet: ADULT DIET; Regular; No Added Salt (3-4 gm); 1500 ml  ADULT ORAL NUTRITION SUPPLEMENT; Dinner; Low Calorie/High Protein Oral Supplement  ADULT ORAL NUTRITION SUPPLEMENT; Lunch; Fortified Gelatin Oral Supplement      Katelyn Barros, Dietetic Intern   RD office phone ext: [phone]

## 2024-07-18 NOTE — CARE COORDINATION
Care Management Progress Note     Reason for Admission:   Heart failure, unspecified (HCC) [I50.9]  Shortness of breath [R06.02]  Hypoxemia [R09.02]  Acute pulmonary edema (HCC) [J81.0]  Acute congestive heart failure, unspecified heart failure type (HCC) [I50.9]        Patient Admission Status: Inpatient  RUR: Readmission Risk Score: 11.7     Hospitalization in the last 30 days (Readmission):  No             Transition of care plan:  Home with hospice ([facility])   DME to be delivered between 2:30pm-3pm today   BLS  transport @ 3pm       CM notified of Pt discharging today. Pt to discharge home via [facility] BLS @ 3pm with [facility] hospice. Hospice is to do home delivery between 2:30-3pm today.   [facility] is to admit Pt at 5pm.        No further discharge needs indicated at this time. Pt is cleared from CM standpoint.     CHRISTINE Ferrer, CM  [facility] Care Manager  [phone]

## 2024-07-18 NOTE — DISCHARGE SUMMARY
Discharge Summary   Please note that this dictation was completed with [employer], the computer voice recognition software.  Quite often unanticipated grammatical, syntax, homophones, and other interpretive errors are inadvertently transcribed by the computer software.  Please disregard these errors.  Please excuse any errors that have escaped final proofreading.    PATIENT ID: Tyrell Garrido  MRN: [id number]   YOB: 1920    DATE OF ADMISSION: 7/12/2024  2:50 PM    DATE OF DISCHARGE: 7/18/2024  PRIMARY CARE PROVIDER: Catracho Brantley MD         ATTENDING PHYSICIAN: Ronaldo Bowman MD  DISCHARGING PROVIDER: Ronaldo Bowman MD       CONSULTATIONS: IP CONSULT TO DIETITIAN  IP CONSULT TO CARDIOLOGY  IP WOUND CARE NURSE CONSULT TO EVAL  IP WOUND CARE NURSE CONSULT TO EVAL  IP CONSULT TO CASE MANAGEMENT  IP CONSULT TO HOSPICE    PROCEDURES/SURGERIES: * No surgery found *    ADMITTING HPI from excerpted H&P     89 y.o.  male with PMHx hypertension and BPH who presents with shortness of breath and leg swelling suspicious for new onset CHF.         HOSPITAL COURSE & DISCHARGE DIAGNOSIS/ PLAN:     Heart failure with preserved ejection fraction  Pulm edema due to above  Respiratory failure due to above  Left ankle wound  BPH  Hypertension  Transition to hospice  Patient was discharged on hospice and follow-up with hospice further management              PENDING TEST RESULTS:   At the time of discharge the following test results are still pending: None    FOLLOW UP APPOINTMENTS:    [unfilled]     ADDITIONAL CARE RECOMMENDATIONS: Follow-up with hospice    DIET: regular diet    ACTIVITY: activity as tolerated    WOUND CARE: Per hospice    EQUIPMENT needed: Per hospice      DISCHARGE MEDICATIONS:     Medication List        START taking these medications      furosemide 20 MG tablet  Commonly known as: LASIX  Take 1 tablet by mouth daily            CONTINUE taking these medications      tamsulosin 0.4 MG

## 2024-07-21 LAB
BACTERIA SPEC CULT: NORMAL
BACTERIA SPEC CULT: NORMAL
SERVICE CMNT-IMP: NORMAL
SERVICE CMNT-IMP: NORMAL
